# Patient Record
Sex: FEMALE | Race: WHITE | NOT HISPANIC OR LATINO | Employment: OTHER | URBAN - METROPOLITAN AREA
[De-identification: names, ages, dates, MRNs, and addresses within clinical notes are randomized per-mention and may not be internally consistent; named-entity substitution may affect disease eponyms.]

---

## 2019-07-08 ENCOUNTER — APPOINTMENT (OUTPATIENT)
Dept: PREADMISSION TESTING | Facility: HOSPITAL | Age: 74
End: 2019-07-08

## 2019-07-08 LAB
ANION GAP SERPL CALCULATED.3IONS-SCNC: 12 MMOL/L (ref 5–15)
BUN BLD-MCNC: 16 MG/DL (ref 8–23)
BUN/CREAT SERPL: 21.1 (ref 7–25)
CALCIUM SPEC-SCNC: 9.1 MG/DL (ref 8.6–10.5)
CHLORIDE SERPL-SCNC: 102 MMOL/L (ref 98–107)
CO2 SERPL-SCNC: 28 MMOL/L (ref 22–29)
CREAT BLD-MCNC: 0.76 MG/DL (ref 0.57–1)
DEPRECATED RDW RBC AUTO: 50.2 FL (ref 37–54)
ERYTHROCYTE [DISTWIDTH] IN BLOOD BY AUTOMATED COUNT: 14.4 % (ref 12.3–15.4)
GFR SERPL CREATININE-BSD FRML MDRD: 74 ML/MIN/1.73
GLUCOSE BLD-MCNC: 86 MG/DL (ref 65–99)
HCT VFR BLD AUTO: 38.4 % (ref 34–46.6)
HGB BLD-MCNC: 12.1 G/DL (ref 12–15.9)
MCH RBC QN AUTO: 30.6 PG (ref 26.6–33)
MCHC RBC AUTO-ENTMCNC: 31.5 G/DL (ref 31.5–35.7)
MCV RBC AUTO: 97 FL (ref 79–97)
PLATELET # BLD AUTO: 315 10*3/MM3 (ref 140–450)
PMV BLD AUTO: 11.2 FL (ref 6–12)
POTASSIUM BLD-SCNC: 4.5 MMOL/L (ref 3.5–5.2)
RBC # BLD AUTO: 3.96 10*6/MM3 (ref 3.77–5.28)
SODIUM BLD-SCNC: 142 MMOL/L (ref 136–145)
WBC NRBC COR # BLD: 8.79 10*3/MM3 (ref 3.4–10.8)

## 2019-07-08 PROCEDURE — 36415 COLL VENOUS BLD VENIPUNCTURE: CPT

## 2019-07-08 PROCEDURE — 80048 BASIC METABOLIC PNL TOTAL CA: CPT | Performed by: SURGERY

## 2019-07-08 PROCEDURE — 93005 ELECTROCARDIOGRAM TRACING: CPT

## 2019-07-08 PROCEDURE — 85027 COMPLETE CBC AUTOMATED: CPT | Performed by: SURGERY

## 2019-07-08 PROCEDURE — 93010 ELECTROCARDIOGRAM REPORT: CPT | Performed by: INTERNAL MEDICINE

## 2019-07-09 ENCOUNTER — TRANSCRIBE ORDERS (OUTPATIENT)
Dept: ADMINISTRATIVE | Facility: HOSPITAL | Age: 74
End: 2019-07-09

## 2019-07-09 DIAGNOSIS — C20 MALIGNANT NEOPLASM OF RECTUM (HCC): Primary | ICD-10-CM

## 2019-07-10 ENCOUNTER — HOSPITAL ENCOUNTER (OUTPATIENT)
Dept: GENERAL RADIOLOGY | Facility: HOSPITAL | Age: 74
Discharge: HOME | End: 2019-07-10

## 2019-07-10 ENCOUNTER — HOSPITAL ENCOUNTER (OUTPATIENT)
Dept: GENERAL RADIOLOGY | Facility: HOSPITAL | Age: 74
Discharge: HOME | End: 2019-07-10
Admitting: INTERNAL MEDICINE

## 2019-07-10 DIAGNOSIS — C20 MALIGNANT NEOPLASM OF RECTUM (HCC): ICD-10-CM

## 2019-07-10 PROCEDURE — 71045 X-RAY EXAM CHEST 1 VIEW: CPT

## 2019-07-10 PROCEDURE — 77001 FLUOROGUIDE FOR VEIN DEVICE: CPT

## 2020-02-26 RX ORDER — RAMIPRIL 5 MG/1
5 CAPSULE ORAL DAILY
COMMUNITY

## 2020-02-26 RX ORDER — FUROSEMIDE 20 MG/1
20 TABLET ORAL 2 TIMES DAILY
COMMUNITY

## 2020-02-26 RX ORDER — ISOSORBIDE MONONITRATE 30 MG/1
30 TABLET, EXTENDED RELEASE ORAL DAILY
COMMUNITY

## 2020-02-26 RX ORDER — PRAVASTATIN SODIUM 20 MG
20 TABLET ORAL DAILY
COMMUNITY

## 2020-02-26 RX ORDER — OMEPRAZOLE 40 MG/1
40 CAPSULE, DELAYED RELEASE ORAL DAILY
COMMUNITY

## 2020-02-26 RX ORDER — HYDROCHLOROTHIAZIDE 25 MG/1
25 TABLET ORAL DAILY
COMMUNITY

## 2020-02-26 RX ORDER — FOLIC ACID 1 MG/1
1 TABLET ORAL DAILY
COMMUNITY

## 2020-02-27 ENCOUNTER — OFFICE VISIT (OUTPATIENT)
Dept: NEUROSURGERY | Facility: CLINIC | Age: 75
End: 2020-02-27

## 2020-02-27 VITALS — WEIGHT: 133 LBS | TEMPERATURE: 98.4 F | HEIGHT: 61 IN | BODY MASS INDEX: 25.11 KG/M2 | RESPIRATION RATE: 15 BRPM

## 2020-02-27 DIAGNOSIS — F41.8 ANXIOUS DEPRESSION: ICD-10-CM

## 2020-02-27 DIAGNOSIS — M48.061 FORAMINAL STENOSIS OF LUMBAR REGION: ICD-10-CM

## 2020-02-27 DIAGNOSIS — M43.16 SPONDYLOLISTHESIS OF LUMBAR REGION: ICD-10-CM

## 2020-02-27 DIAGNOSIS — M51.36 DDD (DEGENERATIVE DISC DISEASE), LUMBAR: ICD-10-CM

## 2020-02-27 DIAGNOSIS — M54.9 MECHANICAL BACK PAIN: Primary | ICD-10-CM

## 2020-02-27 PROCEDURE — 99204 OFFICE O/P NEW MOD 45 MIN: CPT | Performed by: NEUROLOGICAL SURGERY

## 2020-02-27 NOTE — PROGRESS NOTES
NAME: MIGUEL VELÁSQUEZ   DOS: 2020  : 1945  PCP: Man Lehman MD    Chief Complaint:    Chief Complaint   Patient presents with   • Back Pain       History of Present Illness:  74 y.o. female   I saw this 74-year-old female with a history of advanced rheumatoid disease she is to be on Humira and well controlled but has had difficulty with fibromyalgia and rheumatologic complaints for a number of years.  She takes care of and infirmed  with multiple back surgeries she has a history of atrial fibrillation is on Eliquis.    She reports today that she will hurts all over she has a history of back pain bilateral radiating pain to the flank she does have overtones of neurogenic claudication but the predominance of her symptoms is knee hip ankle shoulder and wrist pain she denies any bowel bladder incontinence issues she reports no worsening she is here for evaluation    PMHX  Allergies:  Allergies   Allergen Reactions   • Milnacipran Other (See Comments)     Headache   • Ranolazine Other (See Comments)     Headache     • Penicillins Rash     Medications    Current Outpatient Medications:   •  apixaban (ELIQUIS) 5 MG tablet tablet, Take 5 mg by mouth 2 (Two) Times a Day., Disp: , Rfl:   •  dronedarone (MULTAQ) 400 MG tablet, Take 400 mg by mouth 2 (Two) Times a Day With Meals., Disp: , Rfl:   •  folic acid (FOLVITE) 1 MG tablet, Take 1 mg by mouth Daily., Disp: , Rfl:   •  furosemide (LASIX) 20 MG tablet, Take 20 mg by mouth 2 (Two) Times a Day., Disp: , Rfl:   •  hydroCHLOROthiazide (HYDRODIURIL) 25 MG tablet, Take 25 mg by mouth Daily., Disp: , Rfl:   •  isosorbide mononitrate (IMDUR) 30 MG 24 hr tablet, Take 30 mg by mouth Daily., Disp: , Rfl:   •  methotrexate 2.5 MG tablet, Take  by mouth 3 (Three) Doses Each Week. Take Doses 12 (Twelve) Hours Apart., Disp: , Rfl:   •  omeprazole (priLOSEC) 40 MG capsule, Take 40 mg by mouth Daily., Disp: , Rfl:   •  pravastatin (PRAVACHOL) 20 MG tablet,  Take 20 mg by mouth Daily., Disp: , Rfl:   •  ramipril (ALTACE) 5 MG capsule, Take 5 mg by mouth Daily., Disp: , Rfl:   •  sertraline (ZOLOFT) 50 MG tablet, Take 50 mg by mouth Daily., Disp: , Rfl:   Past Medical History:  Past Medical History:   Diagnosis Date   • Atrial fibrillation (CMS/HCC)    • B12 deficiency    • Female bladder prolapse    • Fibromyalgia    • Low back pain    • Rheumatoid arthritis (CMS/HCC)      Past Surgical History:  History reviewed. No pertinent surgical history.  Social Hx:  Social History     Tobacco Use   • Smoking status: Never Smoker   • Smokeless tobacco: Never Used   Substance Use Topics   • Alcohol use: Never     Frequency: Never   • Drug use: Never     Family Hx:  Family History   Problem Relation Age of Onset   • Diabetes Mother    • Diabetes Father    • Diabetes Brother    • Diabetes Son      Review of Systems:        Review of Systems   Constitutional: Negative for activity change, appetite change, chills, diaphoresis, fatigue, fever and unexpected weight change.   HENT: Negative for congestion, dental problem, drooling, ear discharge, ear pain, facial swelling, hearing loss, mouth sores, nosebleeds, postnasal drip, rhinorrhea, sinus pressure, sneezing, sore throat, tinnitus, trouble swallowing and voice change.    Eyes: Negative for photophobia, pain, discharge, redness, itching and visual disturbance.   Respiratory: Negative for apnea, cough, choking, chest tightness, shortness of breath, wheezing and stridor.    Cardiovascular: Negative for chest pain, palpitations and leg swelling.   Gastrointestinal: Negative for abdominal distention, abdominal pain, anal bleeding, blood in stool, constipation, diarrhea, nausea, rectal pain and vomiting.   Endocrine: Negative for cold intolerance, heat intolerance, polydipsia, polyphagia and polyuria.   Genitourinary: Negative for decreased urine volume, difficulty urinating, dysuria, enuresis, flank pain, frequency, genital sores,  hematuria and urgency.   Musculoskeletal: Positive for arthralgias and back pain. Negative for gait problem, joint swelling, myalgias, neck pain and neck stiffness.   Skin: Negative for color change, pallor, rash and wound.   Allergic/Immunologic: Negative for environmental allergies, food allergies and immunocompromised state.   Neurological: Negative for dizziness, tremors, seizures, syncope, facial asymmetry, speech difficulty, weakness, light-headedness, numbness and headaches.   Hematological: Negative for adenopathy. Does not bruise/bleed easily.   Psychiatric/Behavioral: Negative for agitation, behavioral problems, confusion, decreased concentration, dysphoric mood, hallucinations, self-injury, sleep disturbance and suicidal ideas. The patient is not nervous/anxious and is not hyperactive.    All other systems reviewed and are negative.           Physical Examination:  Vitals:    02/27/20 1246   Resp: 15   Temp: 98.4 °F (36.9 °C)      General Appearance:   Appears appropriate for age.  Neurological examination:  Neurologic Exam  Vital signs were reviewed and documented in the chart  Patient appeared in good neurologic function with normal comprehension fluent speech  Mood and affect are normal  Sense of smell deferred    Pupils symmetric equally reactive funduscopic exam not visualized   Visual fields intact to confrontation  Extraocular movements intact  Face motor function is symmetric  Facial sensations normal  Hearing intact to finger rub hearing intact to finger rub  Tongue is midline  Palate symmetric  Swallowing normal  Shoulder shrug normal    Muscle bulk and tone slight build  5 out of 5 strength no motor drift she has difficulty toe walking secondary to arthritis in her toes but can push up bilaterally  Gait normal intact wide-based with mildly stooped forward posture  Negative Romberg  No clonus long tract signs or myelopathy    Reflexes symmetric she has trace but actually winces in pain with  elbow and knee reflexes they are mostly absent throughout  1+ edema noted and extremities skin appears normal    Straight leg raise sign positive bilaterally but secondary to guarding  Bilateral signs of intrinsic hip dysfunction  Back is without any lesions or abnormality          Review of Imaging/DATA:  I reviewed her MRI is relatively impressive she has bilateral high-grade foraminal stenosis at 3 4 she is got a spondylolisthesis 4 5 and may be spondylolysis 5 1  Diagnoses/Plan:    Ms. Myles is a 74 y.o. female   1.  Quality of life issues secondary to depression, coping skills, chronic rheumatoid disease, fibromyalgi plan for she needs a 360 pain evaluation with somebody who also does opioid management    2.a needs aquatic therapy physical therapy range of motion exercises    3.  Needs pain psychologist to help with coping skills at home I explained and spent 45 minutes with her if they can get her to a point where her symptoms are controlled and she really does not only have quality of life issues as it relates to bilateral neurogenic claudication she may be a candidate for lumbar interbody fusion multilevel I explained the risk would be high given her age and atrial fibrillation history as well as likely osteopenia but she does have significant spinal pathology intraforaminal E but nothing that should progress to a paralytic issue    Is been a pleasure via neurosurgical care will be happy to see her back anytime in the future

## 2020-02-28 DIAGNOSIS — M48.062 SPINAL STENOSIS, LUMBAR REGION, WITH NEUROGENIC CLAUDICATION: Primary | ICD-10-CM

## 2024-05-10 ENCOUNTER — TELEPHONE (OUTPATIENT)
Age: 79
End: 2024-05-10
Payer: MEDICARE

## 2024-05-10 DIAGNOSIS — M05.741 RHEUMATOID ARTHRITIS INVOLVING BOTH HANDS WITH POSITIVE RHEUMATOID FACTOR: Primary | ICD-10-CM

## 2024-05-10 DIAGNOSIS — M05.742 RHEUMATOID ARTHRITIS INVOLVING BOTH HANDS WITH POSITIVE RHEUMATOID FACTOR: Primary | ICD-10-CM

## 2024-05-10 DIAGNOSIS — R53.82 CHRONIC FATIGUE: ICD-10-CM

## 2024-05-10 DIAGNOSIS — Z79.899 MEDICATION MANAGEMENT: ICD-10-CM

## 2024-05-10 RX ORDER — METHYLPREDNISOLONE 4 MG/1
TABLET ORAL
Qty: 1 EACH | Refills: 0 | Status: SHIPPED | OUTPATIENT
Start: 2024-05-10

## 2024-05-10 NOTE — TELEPHONE ENCOUNTER
I called and spoke to Yadi, pt.'s daughter. She stated that she has an apt. This upcoming week to have the port removed.  Pt. Is also in a flare X 1 week. The MCP joints in both hands are red, warm, swollen and painful.  She wants to know if there is anything the pt. Can take until her f/u on 5/22/24 ?

## 2024-05-10 NOTE — TELEPHONE ENCOUNTER
is she still on subcu Actemra?  If so would hold it the week before and the week after her procedure.  We could send her a Medrol Dosepak. Needs cbc/cmp

## 2024-05-10 NOTE — TELEPHONE ENCOUNTER
Spoke with pt. Daughter Yadi and let her know we are sending her in a medrol laurel.  She Is still taking Actemra. Advised her to hold  a week before and after procedure.  I'm mailing her a lab order

## 2024-05-24 ENCOUNTER — TELEPHONE (OUTPATIENT)
Age: 79
End: 2024-05-24
Payer: MEDICARE

## 2024-05-24 DIAGNOSIS — M81.0 OSTEOPOROSIS, UNSPECIFIED OSTEOPOROSIS TYPE, UNSPECIFIED PATHOLOGICAL FRACTURE PRESENCE: Primary | ICD-10-CM

## 2024-05-24 NOTE — TELEPHONE ENCOUNTER
PTS DAUGHTER CHRISTINA PINKSTON CALLED REGARDING A DEXA SCAN FOR HER MOM.  SHE WAS SUPPOSED TO HAVE ONE THIS MONTH.  SINCE WE WON'T BE DOING THEM UNTIL EARLY 2025 SHE WONDERED IF SHE COULD GET AN ORDER SENT TO HER SO THEY CAN GET IT DONE ELSEWHERE.  CHRISTINA'S ADDRESS IS   [address] [place], [place] [id number].

## 2024-06-02 NOTE — TELEPHONE ENCOUNTER
1.  How is she doing after her port was removed?  #2 we can schedule her bone density scan for [facility] or [facility] if that is agreeable to her  3 patient needs ambulatory referral for a bone density scan diagnosis is osteoporosis scan was in 2021

## 2024-07-03 ENCOUNTER — TELEPHONE (OUTPATIENT)
Age: 79
End: 2024-07-03

## 2024-07-03 DIAGNOSIS — M81.0 OSTEOPOROSIS, UNSPECIFIED OSTEOPOROSIS TYPE, UNSPECIFIED PATHOLOGICAL FRACTURE PRESENCE: Primary | ICD-10-CM

## 2024-07-03 NOTE — TELEPHONE ENCOUNTER
Spoke to pt's daughter Yadi.  Pt is doing well from Port removal.  She said the pt's Alzhimer's has gotten worse.  I let her know that [facility] is going to look through her schedule and work her in.  Pt. Top of hand sore and swollen.  She is taking Actemra.   She wants the Dexa in [place] or [place].

## 2024-07-24 ENCOUNTER — TRANSCRIBE ORDERS (OUTPATIENT)
Dept: ADMINISTRATIVE | Facility: HOSPITAL | Age: 79
End: 2024-07-24
Payer: MEDICARE

## 2024-07-24 DIAGNOSIS — Z78.0 POST-MENOPAUSAL: Primary | ICD-10-CM

## 2024-08-05 ENCOUNTER — TELEPHONE (OUTPATIENT)
Age: 79
End: 2024-08-05
Payer: MEDICARE

## 2024-08-05 NOTE — TELEPHONE ENCOUNTER
CALLED PT TO RESCHEDULE APPT WITH DR. VILLALPANDO, NO ANSWER. LEFT VM FOR PT TO CALL BACK. IF PT CALLS BACK, SCHEDULE PT WITH RONNIE OR EVVA IF NEEDED. IF PT WANTS TO SEE DR. VILLALPANDO THEY MUST BE SCHEDULED AFTER 12/01

## 2024-08-05 NOTE — TELEPHONE ENCOUNTER
Pt daughter called to request dexa scan order be sent to [facility] in [place], Faxed order to [phone]

## 2024-08-07 ENCOUNTER — TELEPHONE (OUTPATIENT)
Age: 79
End: 2024-08-07
Payer: MEDICARE

## 2024-08-07 RX ORDER — TOCILIZUMAB 180 MG/ML
162 INJECTION, SOLUTION SUBCUTANEOUS
COMMUNITY
End: 2024-08-19

## 2024-08-07 NOTE — TELEPHONE ENCOUNTER
Caller: CHRISTINA    Relationship to patient: Child    Best call back number: [phone]; [facility] TO [facility]    Patient is needing: PATIENT'S DAUGHTER CALLED; PATIENT  IS OUT OF REFILLS FOR THE RX ACTEMRA THAT SHE TAKES EVERY TWO WEEKS. IT IS MAILED TO HER FROM [facility].    MAIN #: [phone]; [address] [address]    PHARMACY # [phone]

## 2024-08-07 NOTE — TELEPHONE ENCOUNTER
PTS DAUGHTER IS REQUESTING REFILLS ON ACTEMRA. THE PT WAS SCHEDULED FOR A FOLLOW UP IN MAY BUT HAD TO BE CANCELED DUE TO PROVIDER BEING OUT. I CALLED THE PTS DAUGHTER TO RESCHEDULE BUT HAD TO LEAVE A MESSAGE.   T IS MAILED TO HER FROM [facility].     MAIN #: [phone]; [address] [facility], [place] [address]     PHARMACY # [phone]

## 2024-08-12 NOTE — TELEPHONE ENCOUNTER
Patients daughter called again regarding Actemra refills. I advised her we need updated labs on patient (last form 12/23) before we can send in the refills. Verbalized understanding stating they would go today to have those drawn. Faxed lab order to [facility] lab.

## 2024-08-19 NOTE — TELEPHONE ENCOUNTER
Patient now has follow up appointment and we received lab results from 8/13/24. Sent in Actemra to [facility].

## 2024-08-29 ENCOUNTER — TELEPHONE (OUTPATIENT)
Age: 79
End: 2024-08-29
Payer: MEDICARE

## 2024-09-13 PROBLEM — M79.7 FIBROMYALGIA: Status: ACTIVE | Noted: 2024-09-13

## 2024-09-13 PROBLEM — Z79.899 ENCOUNTER FOR LONG-TERM (CURRENT) USE OF HIGH-RISK MEDICATION: Status: ACTIVE | Noted: 2024-09-13

## 2024-09-13 PROBLEM — N28.9 RENAL INSUFFICIENCY: Status: ACTIVE | Noted: 2024-09-13

## 2024-09-13 PROBLEM — Z51.81 ENCOUNTER FOR MEDICATION MONITORING: Status: ACTIVE | Noted: 2024-09-13

## 2024-09-13 PROBLEM — M81.0 OSTEOPOROSIS: Status: ACTIVE | Noted: 2024-09-13

## 2024-09-17 ENCOUNTER — TELEPHONE (OUTPATIENT)
Age: 79
End: 2024-09-17

## 2024-09-17 ENCOUNTER — OFFICE VISIT (OUTPATIENT)
Age: 79
End: 2024-09-17
Payer: MEDICARE

## 2024-09-17 VITALS
HEIGHT: 61 IN | BODY MASS INDEX: 26.45 KG/M2 | WEIGHT: 140.1 LBS | DIASTOLIC BLOOD PRESSURE: 78 MMHG | SYSTOLIC BLOOD PRESSURE: 140 MMHG | HEART RATE: 60 BPM | TEMPERATURE: 97.1 F

## 2024-09-17 DIAGNOSIS — R06.2 WHEEZES: ICD-10-CM

## 2024-09-17 DIAGNOSIS — N28.9 RENAL INSUFFICIENCY: ICD-10-CM

## 2024-09-17 DIAGNOSIS — R53.83 FATIGUE, UNSPECIFIED TYPE: ICD-10-CM

## 2024-09-17 DIAGNOSIS — Z51.81 ENCOUNTER FOR MEDICATION MONITORING: ICD-10-CM

## 2024-09-17 DIAGNOSIS — M79.7 FIBROMYALGIA: ICD-10-CM

## 2024-09-17 DIAGNOSIS — M81.0 OSTEOPOROSIS, UNSPECIFIED OSTEOPOROSIS TYPE, UNSPECIFIED PATHOLOGICAL FRACTURE PRESENCE: ICD-10-CM

## 2024-09-17 DIAGNOSIS — Z79.899 ENCOUNTER FOR LONG-TERM (CURRENT) USE OF HIGH-RISK MEDICATION: ICD-10-CM

## 2024-09-17 DIAGNOSIS — M06.9 RHEUMATOID ARTHRITIS, INVOLVING UNSPECIFIED SITE, UNSPECIFIED WHETHER RHEUMATOID FACTOR PRESENT: Primary | ICD-10-CM

## 2024-09-17 PROCEDURE — G2211 COMPLEX E/M VISIT ADD ON: HCPCS | Performed by: NURSE PRACTITIONER

## 2024-09-17 PROCEDURE — 1160F RVW MEDS BY RX/DR IN RCRD: CPT | Performed by: NURSE PRACTITIONER

## 2024-09-17 PROCEDURE — 1159F MED LIST DOCD IN RCRD: CPT | Performed by: NURSE PRACTITIONER

## 2024-09-17 PROCEDURE — 99215 OFFICE O/P EST HI 40 MIN: CPT | Performed by: NURSE PRACTITIONER

## 2024-09-17 RX ORDER — FAMOTIDINE 20 MG/1
20 TABLET, FILM COATED ORAL 2 TIMES DAILY
Qty: 60 TABLET | Refills: 3 | Status: SHIPPED | OUTPATIENT
Start: 2024-09-17

## 2024-09-17 RX ORDER — GABAPENTIN 100 MG/1
100 CAPSULE ORAL 2 TIMES DAILY
Qty: 60 CAPSULE | Refills: 3 | Status: SHIPPED | OUTPATIENT
Start: 2024-09-17

## 2024-09-17 RX ORDER — METHOTREXATE 2.5 MG/1
15 TABLET ORAL WEEKLY
Qty: 24 TABLET | Refills: 3 | Status: SHIPPED | OUTPATIENT
Start: 2024-09-17

## 2024-09-17 RX ORDER — FOLIC ACID 1 MG/1
1 TABLET ORAL DAILY
Qty: 90 TABLET | Refills: 1 | Status: SHIPPED | OUTPATIENT
Start: 2024-09-17

## 2024-09-27 ENCOUNTER — TELEPHONE (OUTPATIENT)
Age: 79
End: 2024-09-27
Payer: MEDICARE

## 2024-10-04 ENCOUNTER — SPECIALTY PHARMACY (OUTPATIENT)
Age: 79
End: 2024-10-04
Payer: MEDICARE

## 2024-10-04 ENCOUNTER — SPECIALTY PHARMACY (OUTPATIENT)
Dept: GENERAL RADIOLOGY | Facility: HOSPITAL | Age: 79
End: 2024-10-04
Payer: MEDICARE

## 2024-10-04 RX ORDER — DENOSUMAB 60 MG/ML
60 INJECTION SUBCUTANEOUS
Qty: 180 ML | Refills: 0 | Status: SHIPPED | OUTPATIENT
Start: 2024-10-04

## 2024-10-04 NOTE — PROGRESS NOTES
Specialty Pharmacy Patient Management Program  Rheumatology Initial Assessment     Marcia Myles was referred by an Rheumatology provider to the Rheumatology Patient Management program offered by [facility] for Osteoporosis on 10/04/24.  An initial outreach was conducted, including assessment of therapy appropriateness and specialty medication education for Prolia. The patient was introduced to services offered by [facility], including: regular assessments, refill coordination, curbside pick-up or mail order delivery options, prior authorization maintenance, and financial assistance programs as applicable. The patient was also provided with contact information for the pharmacy team.     Insurance Coverage & Financial Support  [employer] D     Relevant Past Medical History and Comorbidities  Relevant medical history and concomitant health conditions were discussed with the patient. The patient's chart has been reviewed for relevant past medical history and comorbid conditions and updated as necessary.  Past Medical History:   Diagnosis Date    Alzheimer disease     Followed by  neuroscience    Atrial fibrillation     RONNIE 02/05/2018 -Followed by Dr. Marie. Treated with Meds.    B12 deficiency     Bladder prolapse, female, acquired     DDD (degenerative disc disease), lumbar     severe- Dr. Parry/ pain clinic    Encounter for immunological test     Fatigue     Female bladder prolapse     Fibromyalgia     Irritability     Low back pain     Osteoporosis     Rash     Rheumatoid arthritis      Social History     Socioeconomic History    Marital status:     Number of children: 2   Tobacco Use    Smoking status: Never    Smokeless tobacco: Never   Vaping Use    Vaping status: Never Used   Substance and Sexual Activity    Alcohol use: Never    Drug use: Never    Sexual activity: Defer       Problem list reviewed by Jami Simpson, PharmD on 10/4/2024 at  9:15  "AM    Allergies  Known allergies and reactions were discussed with the patient. The patient's chart has been reviewed for  allergy information and updated as necessary.   Allergies   Allergen Reactions    Milnacipran Other (See Comments)     Headache    Ranolazine Other (See Comments)     Headache      Penicillins Rash       Allergies reviewed by Jami Simpson, PharmOCTAVIA on 10/4/2024 at  9:15 AM  Allergies reviewed by Jami Simpson PharmD on 10/4/2024 at  9:21 AM    Relevant Laboratory Values  Relevant laboratory values were discussed with the patient. The following specialty medication dose adjustment(s) are recommended:     No results found for: \"HGBA1C\"  Lab Results   Component Value Date    GLUCOSE 86 07/08/2019    CALCIUM 9.1 07/08/2019     07/08/2019    K 4.5 07/08/2019    CO2 28.0 07/08/2019     07/08/2019    BUN 16 07/08/2019    CREATININE 0.76 07/08/2019    EGFRIFNONA 74 07/08/2019    BCR 21.1 07/08/2019    ANIONGAP 12.0 07/08/2019     No results found for: \"CHOL\", \"CHLPL\", \"TRIG\", \"HDL\", \"LDL\", \"LDLDIRECT\"      Current Medication List  This medication list has been reviewed with the patient and evaluated for any interactions or necessary modifications/recommendations, and updated to include all prescription medications, OTC medications, and supplements the patient is currently taking.  This list reflects what is contained in the patient's profile, which has also been marked as reviewed to communicate to other providers it is the most up to date version of the patient's current medication therapy.     Current Outpatient Medications:     denosumab (Prolia) 60 MG/ML solution prefilled syringe syringe, Inject 1 mL under the skin into the appropriate area as directed Every 6 (Six) Months. in the upper arm, upper thigh or abdomen, Disp: 180 mL, Rfl: 0    acetaminophen (TYLENOL) 650 MG 8 hr tablet, Take 1 tablet by mouth Every 8 (Eight) Hours As Needed (swallowing whole with water. Do not break, " crush, dissolve and/or chew.)., Disp: , Rfl:     apixaban (ELIQUIS) 5 MG tablet tablet, Take 1 tablet by mouth 2 (Two) Times a Day., Disp: , Rfl:     Cholecalciferol 25 MCG (1000 UT) tablet, Take 1 tablet by mouth Daily., Disp: , Rfl:     dronedarone (MULTAQ) 400 MG tablet, Take 1 tablet by mouth 2 (Two) Times a Day With Meals., Disp: , Rfl:     famotidine (PEPCID) 20 MG tablet, Take 1 tablet by mouth 2 (Two) Times a Day., Disp: 60 tablet, Rfl: 3    folic acid (FOLVITE) 1 MG tablet, Take 1 tablet by mouth Daily., Disp: 90 tablet, Rfl: 1    furosemide (LASIX) 20 MG tablet, Take 1 tablet by mouth 2 (Two) Times a Day., Disp: , Rfl:     gabapentin (NEURONTIN) 100 MG capsule, Take 1 capsule by mouth 2 (Two) Times a Day., Disp: 60 capsule, Rfl: 3    hydroCHLOROthiazide (HYDRODIURIL) 25 MG tablet, Take 1 tablet by mouth Daily., Disp: , Rfl:     isosorbide mononitrate (IMDUR) 30 MG 24 hr tablet, Take 1 tablet by mouth Daily., Disp: , Rfl:     magnesium 30 MG tablet, Take 1 tablet by mouth Daily., Disp: , Rfl:     methotrexate 2.5 MG tablet, Take 6 tablets by mouth 1 (One) Time Per Week., Disp: 24 tablet, Rfl: 3    methylPREDNISolone (MEDROL) 4 MG dose pack, Indications: Rheumatoid Arthritis, Disp: 1 each, Rfl: 0    omeprazole (priLOSEC) 40 MG capsule, Take 1 capsule by mouth Daily., Disp: , Rfl:     pravastatin (PRAVACHOL) 20 MG tablet, Take 1 tablet by mouth Daily., Disp: , Rfl:     ramipril (ALTACE) 5 MG capsule, Take 1 capsule by mouth Daily., Disp: , Rfl:     sertraline (ZOLOFT) 50 MG tablet, Take 1 tablet by mouth Daily., Disp: 90 tablet, Rfl: 1    Tocilizumab (ACTPEN) 162 MG/0.9ML solution auto-injector injection, Inject 0.9 mL under the skin into the appropriate area as directed Every 14 (Fourteen) Days., Disp: 1.8 mL, Rfl: 2    vitamin B-12 (CYANOCOBALAMIN) 500 MCG tablet, Take 1 tablet by mouth Daily., Disp: , Rfl:     Medicines reviewed by Jami Simpson, PharmD on 10/4/2024 at  9:15 AM    Drug Interactions  No  medication interactions    Initial Education Provided for Specialty Medication  The patient has been provided with the following education and any applicable administration techniques (i.e. self-injection) have been demonstrated for the therapies indicated. All questions and concerns have been addressed prior to the patient receiving the medication, and the patient has verbalized comprehension of the education and any materials provided. Additional patient education shall be provided and documented upon request by the patient, provider, or payer.    PROLIA® (denosumab)  Medication Expectations   Why am I taking this medication? You are taking this medication to help prevent bone fractures because you have osteoporosis.    What should I expect while on this medication? You should expect to see your bone mineral density increase over a period of 18 to 24 months. During this time, you should expect to get regular lab work as directed by your doctor to monitor your progress.    How does the medication work? Osteoporosis can reduce your bone density, increasing your risk for fractures. Prolia is a monoclonal antibody with affinity for nuclear factor-kappa ligand (RANKL).  Osteoblasts secrete RANKL and RANKL activates osteoclast precursors and subsequent osteolysis which promotes release of bone-derived growth factors and increase calcium levels.  Prolia bind to RANKL and blocks interaction of RANKL and RANK receptors located on osteoclasts that lead to osteoclast formation.     How long will I be on this medication for? The amount of time you will be on this medication will be determined by your doctor based on your lab results.     How do I take this medication? Take as directed on your prescription label. Prolia is generally administered by a health care professional.    What are some possible side effects? The most common side effects include increased calcium in the body (hypercalcemia).  You may also experience  back, muscle, or joint pain, headache, signs of a common cold, or pain in arms or legs.  Your doctor will monitor your calcium levels through regular lab work. Talk with your doctor if you notice these or other side effects that do not go away or get better with time.      What happens if I miss a dose? Will be administered by a health care professional.      Medication Safety   What are things I should warn my doctor immediately about? Tell your doctor if you experience confusion, mouth sores, swelling in the arms or legs, feeling very tried or weak, any new strange groin, hip, or thigh pain, very bad bone, joint, or muscle pain, shortness of breath,  jaw swelling or pain, infection like very bad sore throat, pancreatitis, skin infection, high or low blood pressure, chest pain, abnormal heartbeat, harjit bumps or patches on skin, bladder pain, or passing more urine than usual. Talk to your doctor if you are pregnant, planning to become pregnant, or breastfeeding. Also tell your doctor if you notice any signs/symptoms of an allergic reaction (rash, hives, difficulty breathing, etc.) or blisters on your skin.     What are things that I should be aware of? Be cautious of any side effects from this medication. Talk to your doctor if any new ones develop or aren't getting better.     What are some medications that can interact with this one? There are no currently known medication interactions that would be serious.  However, this does not mean that medication interactions cannot happen.  Always tell your doctor or pharmacist immediately if you start taking any new medications, including over-the-counter medications, vitamins, and herbal supplements.       Medication Storage/Handling   How should I handle this medication? Keep this medication out of reach of pets/children in the original container. Do not remove medication from the injector pen.  Use caution when administering medication as needles are required with  use.     How does this medication need to be stored? Store in the refrigerator. It is important to avoid freezing the medication.  Remove from the refrigerator only when you are ready to inject your dose.    How should I dispose of this medication? Discard syringe into sharps container once used.       Resources/Support   How can I remind myself to take this medication? Since this medication must be administered in a healthcare setting, refills will be prompted by provider and clinic.    Is financial support available?  [employer] can provide co-pay cards if you have commercial insurance or patient assistance if you have Medicare or no insurance.    Which vaccines are recommended for me? Talk to your doctor about these vaccines: Flu, Coronavirus (COVID-19), Pneumococcal (pneumonia), Tdap, Hepatitis B, Zoster (shingles)        Adherence and Self-Administration  Adherence related to the patient's specialty therapy was discussed with the patient. The Adherence segment of this outreach has been reviewed and updated.     Is there a concern with patient's ability to self administer the medication correctly and without issue?: No  Were any potential barriers to adherence identified during the initial assessment or patient education?: No  Are there any concerns regarding the patient's understanding of the importance of medication adherence?: No  Methods for Supporting Patient Adherence and/or Self-Administration: n/a-receiving injection in the offoce    Open Medication Therapy Problems  No medication therapy recommendations to display    Goals of Therapy  Goals related to the patient's specialty therapy were discussed with the patient. The Patient Goals segment of this outreach has been reviewed and updated.   Goals Addressed Today        Specialty Pharmacy General Goal      To decrease fractures and improve/stabilize DEXA scan.            Reassessment Plan & Follow-Up  1. Medication Therapy Changes: Prolia 60mg injection every  6 months  2. Related Plans, Therapy Recommendations, or Therapy Problems to Be Addressed: no questions at this time.  3. Pharmacist to perform regular assessments no more than (6) months from the previous assessment.  4. Care Coordinator to set up future refill outreaches, coordinate prescription delivery, and escalate clinical questions to pharmacist.  5. Welcome information and patient satisfaction survey to be sent by specialty pharmacy team with patient's initial fill.    Attestation  Therapeutic appropriateness: Appropriate   I attest the patient was actively involved in and has agreed to the above plan of care. If the prescribed therapy is at any point deemed not appropriate based on the current or future assessments, a consultation will be initiated with the patient's specialty care provider to determine the best course of action. The revised plan of therapy will be documented along with any required assessments and/or additional patient education provided.     Jami Simpson, PharmD, BCPS  Clinical Specialty Pharmacist, Rheumatology   10/4/2024  09:23 EDT

## 2024-10-11 ENCOUNTER — CLINICAL SUPPORT (OUTPATIENT)
Age: 79
End: 2024-10-11
Payer: MEDICARE

## 2024-10-11 VITALS
BODY MASS INDEX: 26.49 KG/M2 | DIASTOLIC BLOOD PRESSURE: 92 MMHG | SYSTOLIC BLOOD PRESSURE: 231 MMHG | TEMPERATURE: 98.1 F | HEART RATE: 54 BPM | WEIGHT: 140.2 LBS

## 2024-10-11 DIAGNOSIS — M81.0 OSTEOPOROSIS, UNSPECIFIED OSTEOPOROSIS TYPE, UNSPECIFIED PATHOLOGICAL FRACTURE PRESENCE: Primary | ICD-10-CM

## 2024-10-11 PROCEDURE — 96372 THER/PROPH/DIAG INJ SC/IM: CPT | Performed by: INTERNAL MEDICINE

## 2024-10-11 NOTE — PROGRESS NOTES
Patient is in for Subcutaneous Prolia injection today. Patient's calcium from 08/13/24 was 9.7 and vitamin D from 10/07/24 was 57.2. Patient's last dose of Prolia was 10/2022 per Mauri GARCIA. Lot number is [id number], expiration date is 02/28/27 and NDC is 75137-810-34. This is from [facility]. Patient is receiving 1mL at a concentration of 60mg/mL in her right upper arm.Patient's  denied any recent dental work. Patient's blood pressure was significantly elevated at appointment today. It was 231/92. Patient and  arrived a hour early for injection and patient has not taken her blood pressure medication today. Patient stated she figured her blood pressure would be elevated as she was very agitated. Patient has alzheimer's and was becoming increasing agitated waiting for appointment and dealing with family stress. Patient was not symptomatic. I spoke with Dr. Lo who stated that this was ok to proceed with injection. Patient tolerated injection well. Vital signs stable. Patient did not appear to be in any distress during visit. There were no signs or symptoms of reaction.

## 2024-10-24 ENCOUNTER — TELEPHONE (OUTPATIENT)
Age: 79
End: 2024-10-24

## 2024-10-24 NOTE — TELEPHONE ENCOUNTER
Caller: CHRISTINA RUSSELL    Relationship: Pilar COLLINS     Best call back number: [phone]    Requested Prescriptions: ACTEMRA 162 MG INJECTION ONCE EVERY TWO WEEKS  Requested Prescriptions      No prescriptions requested or ordered in this encounter        Pharmacy where request should be sent:  [facility] [phone]  PHARMACY SERVICES # [phone]     Last office visit with prescribing clinician: Visit date not found   Last telemedicine visit with prescribing clinician: Visit date not found   Next office visit with prescribing clinician: 1/28/2025     Additional details provided by patient: PT HAS ALZHEIMERS.. PHARMACY ADVISE OUT OF PRESCRIPTIONS AND FAX HAS BEEN SENT TO OFFICE AS WELL.    Does the patient have less than a 3 day supply:  [] Yes  [x] No    If the office needs to give you a call back, can they leave a voicemail: [x] Yes [] No    Mehdi Major Rep   10/24/24 15:03 EDT

## 2024-10-25 NOTE — TELEPHONE ENCOUNTER
Specialty Pharmacy Patient Management Program  Per Protocol Prescription Order/Refill     Patient currently fills medications at Other and is not enrolled in an Rheumatology Patient Management Program.     Requested Prescriptions     Signed Prescriptions Disp Refills    Tocilizumab (ACTPEN) 162 MG/0.9ML solution auto-injector injection 5.4 mL 0     Sig: Inject 0.9 mL under the skin into the appropriate area as directed Every 14 (Fourteen) Days.     Authorizing Provider: JOE VILLALPANDO     Ordering User: SURAJ ALEXANDER     Prescription orders above were sent to the pharmacy per Collaborative Care Agreement Protocol.

## 2024-12-17 ENCOUNTER — TELEPHONE (OUTPATIENT)
Age: 79
End: 2024-12-17
Payer: MEDICARE

## 2024-12-17 NOTE — TELEPHONE ENCOUNTER
File Link    Scan on 12/17/2024 1610 by [employer], [place]: MEDVANTX,ACTEMRA 12/17/24        Key Information    Document ID File Type Document Type Description   [id number] Image REFERRAL/PRE-AUTH CSN - SCAN MEDVANTX,ACTEMRA 12/17/24     Import Information    Attached At Date Time User Dept   Encounter Level 12/17/2024  4:10 PM [employer], [facility]      Encounter    Scanned Document on 12/17/24 with [employer], [place]

## 2024-12-18 NOTE — TELEPHONE ENCOUNTER
Specialty Pharmacy Patient Management Program  Per Protocol Prescription Order/Refill     Patient currently fills medications at Other and is not enrolled in an Rheumatology Patient Management Program.     Requested Prescriptions     Signed Prescriptions Disp Refills    Tocilizumab (ACTPEN) 162 MG/0.9ML solution auto-injector injection 5.4 mL 0     Sig: Inject 0.9 mL under the skin into the appropriate area as directed Every 14 (Fourteen) Days.     Authorizing Provider: PONCHO VELÁSQUEZ     Ordering User: SURAJ ALEXANDER     Prescription orders above were sent to the pharmacy per Collaborative Care Agreement Protocol.

## 2024-12-30 RX ORDER — FAMOTIDINE 20 MG/1
20 TABLET, FILM COATED ORAL 2 TIMES DAILY
Qty: 60 TABLET | Refills: 3 | Status: SHIPPED | OUTPATIENT
Start: 2024-12-30

## 2025-01-22 ENCOUNTER — TELEPHONE (OUTPATIENT)
Age: 80
End: 2025-01-22
Payer: MEDICARE

## 2025-01-22 NOTE — TELEPHONE ENCOUNTER
PT APPT HAD TO BE CANCELLED. IF PT CALLS BACK TO RESCHEDULE, PLEASE SCHEDULE WITH RONNIE LOUISE OR DOROTHY. WE ARE CURRENTLY UNABLE TO RESCHEDULE ANY PATIENTS WITH DR. VILLALPANDO UNTIL FURTHER NOTICE. PTS WILL NEED TO BE RESCHEDULED WITH RONNIE OR DOROTHY. PT HAS BEEN ADDED TO THE CANCELLATION LIST AS HIGH PRIORITY.   -HUB READY TO SCHEDULE.

## 2025-01-27 DIAGNOSIS — M06.9 RHEUMATOID ARTHRITIS, INVOLVING UNSPECIFIED SITE, UNSPECIFIED WHETHER RHEUMATOID FACTOR PRESENT: ICD-10-CM

## 2025-01-27 DIAGNOSIS — Z79.899 ENCOUNTER FOR LONG-TERM (CURRENT) USE OF HIGH-RISK MEDICATION: ICD-10-CM

## 2025-01-28 ENCOUNTER — OFFICE VISIT (OUTPATIENT)
Age: 80
End: 2025-01-28
Payer: MEDICARE

## 2025-01-28 DIAGNOSIS — M79.7 FIBROMYALGIA: ICD-10-CM

## 2025-01-28 DIAGNOSIS — Z51.81 ENCOUNTER FOR MEDICATION MONITORING: ICD-10-CM

## 2025-01-28 DIAGNOSIS — M81.0 OSTEOPOROSIS, UNSPECIFIED OSTEOPOROSIS TYPE, UNSPECIFIED PATHOLOGICAL FRACTURE PRESENCE: ICD-10-CM

## 2025-01-28 DIAGNOSIS — R53.83 FATIGUE, UNSPECIFIED TYPE: ICD-10-CM

## 2025-01-28 DIAGNOSIS — M06.9 RHEUMATOID ARTHRITIS, INVOLVING UNSPECIFIED SITE, UNSPECIFIED WHETHER RHEUMATOID FACTOR PRESENT: Primary | ICD-10-CM

## 2025-01-28 DIAGNOSIS — N28.9 RENAL INSUFFICIENCY: ICD-10-CM

## 2025-01-28 DIAGNOSIS — Z79.899 ENCOUNTER FOR LONG-TERM (CURRENT) USE OF HIGH-RISK MEDICATION: ICD-10-CM

## 2025-01-28 PROBLEM — I10 HYPERTENSIVE DISORDER: Status: ACTIVE | Noted: 2022-07-22

## 2025-01-28 PROBLEM — M51.369 DEGENERATION OF LUMBAR INTERVERTEBRAL DISC: Status: ACTIVE | Noted: 2022-07-22

## 2025-01-28 RX ORDER — METHOTREXATE 2.5 MG/1
TABLET ORAL
Qty: 20 TABLET | Refills: 3 | OUTPATIENT
Start: 2025-01-28

## 2025-01-28 NOTE — ASSESSMENT & PLAN NOTE
4/22 Cr 1.20/47.  Followed by nephrology associates.  6/2022: Cr 0.87/GFR 69  10/22 Cr 0.50/65  5/23 Cr 1.22/45.  12/23 Cr 1.21/46  8/2024 Cr 1.00/57    No nsaids.  Plenty of fluids.   Can use Biofreeze or CBD lotion.

## 2025-01-28 NOTE — ASSESSMENT & PLAN NOTE
Gabapentin.    Signed and discussed controlled substance agreement 9/17/24  UDS ordered  PDMP reviewed

## 2025-01-28 NOTE — TELEPHONE ENCOUNTER
Pt needs a follow up appointment and up to date labs before Methotrexate can be sent in.    HUB OK TO RELAY.

## 2025-01-28 NOTE — ASSESSMENT & PLAN NOTE
Actemra SQ. Well tolerated.  To restart MTX 9/2024  Hepatitis Negative Nov 20th 2017- update with next labs  Chest X-ray no active disease - 1/19  QFN normal 3/23- update today  S/o Covid vaccine + booster. I rec Bivalent booster for covid.    Cbc,Cmp Q 2 mos- 8/2024: mild renal insufficiency, anemia. Discuss anemia with PCP. New standing lab order given.    Would hold Actemra and MTX for any infection or illness, Seek treatment and when well and illness is resolved you may restart medication. Hold Methotrexate one week after booster.

## 2025-01-28 NOTE — ASSESSMENT & PLAN NOTE
Chronic diffuse muscle pain.  Sx wax and wane.  Sleep issues are intermittent.  Currently on Zoloft  50mg per day.   Continue Gabapentin 100 mg BID.     Four Cornerstones of treatment include  1- Treat Sleep- restoration.  2- Exercise- chair aerobics or chair yoga; Water aerobics.  3- Treat anxiety and depression  4- Treat pain. Non addictive muscle relaxer. Biofreeze, Massage , Heat , ICE.  FMS packet given and discussed for her to work with pcp regarding treatment.

## 2025-01-28 NOTE — ASSESSMENT & PLAN NOTE
Severe; Boniva x 2 yrs, then fosamax x 4 yrs.  DEXA severe hips/spine;  Started prolia August 2016.Last Prolia 10/19/22  MRI Sept 2019 showed chronic posterior wedging of L5.  Vitamin D 39.6 in 1/19, TSH normal, B12 344, Kidney, liver , blood counts normal.  6/09/2021 DEXA-  left Hip- TScore -2.3 total hip improved by 5.9%, Femoral neck -2.5 (osteoporosis) No change.  Spine L1-L3 -1.7 (osteopenia) Improved by 15.6%. Left Forearm Total- -4.0 (severe osteoporosis) 1/3 value -3.6 (osteoporosis) no change.   1/22 Vitamin D 30.7.  DEXA 8/13/24 improved to osteopenia left femoral neck -2.3, L spine -1.0    Continue Prolia. Her  thinks her last Prolia was 10/19/2022. We will work on restarting.  Weight bearing exercise as much as possible.   Continue 2000IU of D3 gel caps per day.  If she has any dental work done (root canal, dental implant, extraction) she needs to hold Prolia 3 months before and 3 months after procedure.

## 2025-01-28 NOTE — ASSESSMENT & PLAN NOTE
"Erosive by hand and foot films..per pt, also dx \"AS, psoriasis\" distant past.  Failed meds: Humira (worked well, lost insurance coverage), Cimzia (rash), plaquenil (eyes)  S/p Xeljanz since 4/21/17- Stopped 2/5/18- Not as effective-  Started Simponi Aria March 2018 - Last 1/21/19.  Started Orencia 7/2019- Last 1/15/2020 - per patient this has not been effective.  Started Inflectra March 2nd 2020- Last infusion 12/7/2020  Currently on Actemra  Methotrexate discontinued August 2022 (she was doing well so it was weaned)- restarted 9/2024    Currently on SQ Actemra once weekly   Her  reports she is doing worse. She has had increased hand and foot pain and swelling since her last OV.  Restart MTX 15 mg weekly with daily folic acid.  She reports she will not be able to get free Actemra after this year- we will look into this.  Labs 8/13/24 stable. New order provided to do in 2 months.  Return to clinic 4 months  "

## 2025-02-06 NOTE — ASSESSMENT & PLAN NOTE
Severe; Boniva x 2 yrs, then fosamax x 4 yrs.  Started prolia August 2016.  MRI Sept 2019 showed chronic posterior wedging of L5.  6/09/2021 DEXA-  left Hip TScore -2.3 total hip improved by 5.9%, Femoral neck -2.5 (osteoporosis) No change. Spine L1-L3 -1.7 (osteopenia) Improved by 15.6%. Left Forearm Total- -4.0 (severe osteoporosis) 1/3 value -3.6 (osteoporosis) no change.   DEXA 8/13/24 improved to osteopenia left femoral neck -2.3, L spine -1.0    Continue Prolia.   Most recent dose was given 10/11/24. Next dose due 4/2025.   New lab order placed to do before Prolia injection.   Weight bearing exercise as much as possible.   Continue 2000IU of D3 gel caps per day.  If she has any dental work done (root canal, dental implant, extraction) she needs to hold Prolia 3 months before and 3 months after procedure.  Repeat DEXA 8/2026

## 2025-02-06 NOTE — ASSESSMENT & PLAN NOTE
Gabapentin.    Signed and discussed controlled substance agreement 9/17/24  UDS ordered 2/7/25  PDMP reviewed

## 2025-02-06 NOTE — ASSESSMENT & PLAN NOTE
Actemra SQ. Well tolerated.  MTX restart 9/2024  Hepatitis Negative Nov 20th 2017- update today  QTB negative 10/2/24    Cbc,Cmp Q 2 mos- 10/2024: mild anemia. Discuss anemia with PCP. New standing lab order given.    Would hold Actemra and MTX for any infection or illness, Seek treatment and when well and illness is resolved you may restart medication. Hold Methotrexate one week after booster.

## 2025-02-06 NOTE — ASSESSMENT & PLAN NOTE
Attempted to contact patient no answer vm full will try back later and send livewell message   Chronic diffuse muscle pain.  Sx wax and wane.  Sleep issues are intermittent.    Currently on Zoloft  50mg per day.   Continue Gabapentin 100 mg BID.     Four Cornerstones of treatment include  1- Treat Sleep- restoration.  2- Exercise- chair aerobics or chair yoga; Water aerobics.  3- Treat anxiety and depression  4- Treat pain. Non addictive muscle relaxer. Biofreeze, Massage , Heat , ICE.  FMS packet given and discussed for her to work with pcp regarding treatment.

## 2025-02-06 NOTE — ASSESSMENT & PLAN NOTE
"Erosive by hand and foot films historically  per pt, also dx \"AS, psoriasis\" distant past.  Failed meds: Humira (worked well, lost insurance coverage), Cimzia (rash), plaquenil (eye issues)  S/p Xeljanz since 4/21/17- 2/5/18- Not as effective  Started Simponi Aria March 2018 - Last 1/21/19  Started Orencia 7/2019- Last 1/15/2020 - per patient this was not effective.  Started Inflectra March 2nd 2020- Last infusion 12/7/2020- not effective  Currently on Actemra SQ  Methotrexate discontinued August 2022 (she was doing well so it was weaned)- restarted 9/2024    Currently on SQ Actemra once weekly   Continue MTX 15 mg weekly with daily folic acid.  Labs 10/2/24 stable. New order provided to do today and every 8 weeks.  Return to clinic 4 months  "

## 2025-02-06 NOTE — PROGRESS NOTES
Office Visit       Date: 02/07/2025   Patient Name: Marcia Myles  MRN: [id number]  YOB: 1945    Referring Physician: Man Lehman MD     Chief Complaint:   Chief Complaint   Patient presents with    Follow-up    Rheumatoid Arthritis       History of Present Illness: Marcia Myles is a 79 y.o. female who is here today for follow-up of RA, OA, and osteoporosis.    She is accompanied today by her . She has Alzheimer's dementia.  AM stiffness 40 minutes.  Continues on Actemra weekly.  is concerned that medication may not be covered for 2025. They still have 3-4 doses at home.   She continues on MTX. Tolerates well.   No recent illnesses or infections.  Recent DEXA scan 8/13/2024 did show improvement to osteopenia in the left femoral neck with T-score -2.3.  Her most recent dose of Prolia was 10/11/2024.   She continues on Zoloft and gabapentin for fibromyalgia.    Subjective   Review of systems:  Positive for fatigue, hearing loss, runny nose, photophobia, chest tightness, chest pain, palpitations, joint pain and swelling, back pain, neck stiffness, dry skin, confusion, dizziness, lightheadedness, memory issues, weakness, cold intolerance, decreased concentration, stress, anxiety.  Otherwise negative ROS.    Past Medical History:   Past Medical History:   Diagnosis Date    Alzheimer disease     Followed by  neuroscience    Atrial fibrillation     RONNIE 02/05/2018 -Followed by Dr. Marie. Treated with Meds.    B12 deficiency     Bladder prolapse, female, acquired     DDD (degenerative disc disease), lumbar     severe- Dr. Parry/ pain clinic    Encounter for immunological test     Fatigue     Female bladder prolapse     Fibromyalgia     Irritability     Low back pain     Osteoporosis     Rash     Rheumatoid arthritis        Past Surgical History: History reviewed. No pertinent surgical history.    Family History:   Family History   Problem Relation Age of  Onset    Diabetes Mother     Hypertension Mother     Osteoarthritis Mother     Diabetes Father     Diabetes Brother     Diabetes Son     Gout Son     Osteoarthritis Son     Hypertension Son        Social History:   Social History     Socioeconomic History    Marital status:     Number of children: 2   Tobacco Use    Smoking status: Never    Smokeless tobacco: Never   Vaping Use    Vaping status: Never Used   Substance and Sexual Activity    Alcohol use: Never    Drug use: Never    Sexual activity: Defer       Medications:   Current Outpatient Medications:     acetaminophen (TYLENOL) 650 MG 8 hr tablet, Take 1 tablet by mouth Every 8 (Eight) Hours As Needed (swallowing whole with water. Do not break, crush, dissolve and/or chew.)., Disp: , Rfl:     apixaban (ELIQUIS) 5 MG tablet tablet, Take 1 tablet by mouth 2 (Two) Times a Day., Disp: , Rfl:     Cholecalciferol 25 MCG (1000 UT) tablet, Take 1 tablet by mouth Daily., Disp: , Rfl:     denosumab (Prolia) 60 MG/ML solution prefilled syringe syringe, Inject 1 mL under the skin into the appropriate area as directed Every 6 (Six) Months. in the upper arm, upper thigh or abdomen, Disp: 180 mL, Rfl: 0    dronedarone (MULTAQ) 400 MG tablet, Take 1 tablet by mouth 2 (Two) Times a Day With Meals., Disp: , Rfl:     famotidine (PEPCID) 20 MG tablet, Take 1 tablet by mouth 2 (Two) Times a Day., Disp: 180 tablet, Rfl: 1    folic acid (FOLVITE) 1 MG tablet, Take 1 tablet by mouth Daily., Disp: 90 tablet, Rfl: 1    furosemide (LASIX) 20 MG tablet, Take 1 tablet by mouth 2 (Two) Times a Day., Disp: , Rfl:     gabapentin (NEURONTIN) 100 MG capsule, Take 1 capsule by mouth 2 (Two) Times a Day., Disp: 60 capsule, Rfl: 3    hydroCHLOROthiazide (HYDRODIURIL) 25 MG tablet, Take 1 tablet by mouth Daily., Disp: , Rfl:     isosorbide mononitrate (IMDUR) 30 MG 24 hr tablet, Take 1 tablet by mouth Daily., Disp: , Rfl:     magnesium 30 MG tablet, Take 1 tablet by mouth Daily., Disp: ,  "Rfl:     methotrexate 2.5 MG tablet, Take 6 tablets by mouth 1 (One) Time Per Week., Disp: 24 tablet, Rfl: 3    methylPREDNISolone (MEDROL) 4 MG dose pack, Indications: Rheumatoid Arthritis, Disp: 1 each, Rfl: 0    nitroglycerin (NITROSTAT) 0.4 MG SL tablet, Place 1 tablet under the tongue Every 5 (Five) Minutes As Needed., Disp: , Rfl:     omeprazole (priLOSEC) 40 MG capsule, Take 1 capsule by mouth Daily., Disp: , Rfl:     potassium chloride (KLOR-CON) 8 MEQ CR tablet, Take 1 tablet by mouth 2 (Two) Times a Day., Disp: , Rfl:     pravastatin (PRAVACHOL) 20 MG tablet, Take 1 tablet by mouth Daily., Disp: , Rfl:     ramipril (ALTACE) 5 MG capsule, Take 1 capsule by mouth Daily., Disp: , Rfl:     sertraline (ZOLOFT) 50 MG tablet, Take 1 tablet by mouth Daily., Disp: 90 tablet, Rfl: 1    Tocilizumab (ACTPEN) 162 MG/0.9ML solution auto-injector injection, Inject 0.9 mL under the skin into the appropriate area as directed Every 14 (Fourteen) Days., Disp: 5.4 mL, Rfl: 0    vitamin B-12 (CYANOCOBALAMIN) 500 MCG tablet, Take 1 tablet by mouth Daily., Disp: , Rfl:     Allergies:   Allergies   Allergen Reactions    Milnacipran Other (See Comments)     Headache    Ranolazine Other (See Comments)     Headache      Penicillins Rash       I reviewed the patient's chief complaint, history of present illness, review of systems, past medical history, surgical history, family history, social history, medications and allergy list.     Objective    Vital Signs:   Vitals:    02/07/25 1444   BP: 136/78   Pulse: 62   Temp: 97.6 °F (36.4 °C)   Weight: 64.4 kg (142 lb)   Height: 154.9 cm (61\")   PainSc:   1     Body mass index is 26.83 kg/m².   Defer to PCP       Physical Exam:  Physical Exam  Constitutional:       Appearance: Normal appearance.   HENT:      Head: Normocephalic and atraumatic.   Eyes:      Conjunctiva/sclera: Conjunctivae normal.      Pupils: Pupils are equal, round, and reactive to light.   Cardiovascular:      Rate and " "Rhythm: Normal rate and regular rhythm.      Heart sounds: Normal heart sounds.   Pulmonary:      Effort: Pulmonary effort is normal.      Breath sounds: Normal breath sounds.   Musculoskeletal:         General: Normal range of motion.      Cervical back: Normal range of motion.   Skin:     General: Skin is warm and dry.   Neurological:      General: No focal deficit present.      Mental Status: She is alert. Mental status is at baseline. She is disoriented.            Assessment / Plan    Assessment/Plan:   Diagnoses and all orders for this visit:    1. Rheumatoid arthritis, involving unspecified site, unspecified whether rheumatoid factor present (Primary)  Assessment & Plan:  Erosive by hand and foot films historically  per pt, also dx \"AS, psoriasis\" distant past.  Failed meds: Humira (worked well, lost insurance coverage), Cimzia (rash), plaquenil (eye issues)  S/p Xeljanz since 4/21/17- 2/5/18- Not as effective  Started Simponi Aria March 2018 - Last 1/21/19  Started Orencia 7/2019- Last 1/15/2020 - per patient this was not effective.  Started Inflectra March 2nd 2020- Last infusion 12/7/2020- not effective  Currently on Actemra SQ  Methotrexate discontinued August 2022 (she was doing well so it was weaned)- restarted 9/2024    Currently on SQ Actemra once weekly   Continue MTX 15 mg weekly with daily folic acid.  Labs 10/2/24 stable. New order provided to do today and every 8 weeks.  Return to clinic 4 months    Orders:  -     methotrexate 2.5 MG tablet; Take 6 tablets by mouth 1 (One) Time Per Week.  Dispense: 24 tablet; Refill: 3  -     Comprehensive Metabolic Panel; Standing  -     C-reactive Protein; Standing  -     Sedimentation Rate; Standing  -     CBC Auto Differential; Standing    2. Encounter for long-term (current) use of high-risk medication  Assessment & Plan:  Actemra SQ. Well tolerated.  MTX restart 9/2024  Hepatitis Negative Nov 20th 2017- update today  QTB negative 10/2/24    Cbc,Cmp Q 2 mos- " 10/2024: mild anemia. Discuss anemia with PCP. New standing lab order given.    Would hold Actemra and MTX for any infection or illness, Seek treatment and when well and illness is resolved you may restart medication. Hold Methotrexate one week after booster.    Orders:  -     methotrexate 2.5 MG tablet; Take 6 tablets by mouth 1 (One) Time Per Week.  Dispense: 24 tablet; Refill: 3  -     Comprehensive Metabolic Panel; Standing  -     C-reactive Protein; Standing  -     Sedimentation Rate; Standing  -     CBC Auto Differential; Standing    3. Encounter for medication monitoring  Assessment & Plan:  Gabapentin.    Signed and discussed controlled substance agreement 9/17/24  UDS ordered 2/7/25  PDMP reviewed    Orders:  -     gabapentin (NEURONTIN) 100 MG capsule; Take 1 capsule by mouth 2 (Two) Times a Day.  Dispense: 60 capsule; Refill: 3  -     Urine Drug Screen - Urine, Clean Catch; Future  -     Hepatitis Panel, Acute; Future    4. Fibromyalgia  Assessment & Plan:  Chronic diffuse muscle pain.  Sx wax and wane.  Sleep issues are intermittent.    Currently on Zoloft  50mg per day.   Continue Gabapentin 100 mg BID.     Four Cornerstones of treatment include  1- Treat Sleep- restoration.  2- Exercise- chair aerobics or chair yoga; Water aerobics.  3- Treat anxiety and depression  4- Treat pain. Non addictive muscle relaxer. Biofreeze, Massage , Heat , ICE.  FMS packet given and discussed for her to work with pcp regarding treatment.    Orders:  -     Comprehensive Metabolic Panel; Standing  -     C-reactive Protein; Standing  -     Sedimentation Rate; Standing  -     CBC Auto Differential; Standing  -     sertraline (ZOLOFT) 50 MG tablet; Take 1 tablet by mouth Daily.  Dispense: 90 tablet; Refill: 1    5. Fatigue, unspecified type  Assessment & Plan:  QTB negative 10/2/24  Update hepatitis panel today      6. Osteoporosis, unspecified osteoporosis type, unspecified pathological fracture presence  Assessment &  Plan:  Severe; Boniva x 2 yrs, then fosamax x 4 yrs.  Started prolia August 2016.  MRI Sept 2019 showed chronic posterior wedging of L5.  6/09/2021 DEXA-  left Hip TScore -2.3 total hip improved by 5.9%, Femoral neck -2.5 (osteoporosis) No change. Spine L1-L3 -1.7 (osteopenia) Improved by 15.6%. Left Forearm Total- -4.0 (severe osteoporosis) 1/3 value -3.6 (osteoporosis) no change.   DEXA 8/13/24 improved to osteopenia left femoral neck -2.3, L spine -1.0    Continue Prolia.   Most recent dose was given 10/11/24. Next dose due 4/2025.   New lab order placed to do before Prolia injection.   Weight bearing exercise as much as possible.   Continue 2000IU of D3 gel caps per day.  If she has any dental work done (root canal, dental implant, extraction) she needs to hold Prolia 3 months before and 3 months after procedure.  Repeat DEXA 8/2026    Orders:  -     Comprehensive Metabolic Panel; Future  -     Vitamin D 25 Hydroxy; Future    7. Renal insufficiency  Assessment & Plan:  4/22 Cr 1.20/47.  Followed by nephrology associates.  6/2022: Cr 0.87/GFR 69  10/22 Cr 0.50/65  5/23 Cr 1.22/45.  12/23 Cr 1.21/46  8/2024 Cr 1.00/57    No nsaids.  Plenty of fluids.   Can use Biofreeze or CBD lotion.     Orders:  -     Comprehensive Metabolic Panel; Standing  -     CBC Auto Differential; Standing    8. Chronic fatigue, unspecified  -     Hepatitis Panel, Acute; Future    Other orders  -     folic acid (FOLVITE) 1 MG tablet; Take 1 tablet by mouth Daily.  Dispense: 90 tablet; Refill: 1  -     famotidine (PEPCID) 20 MG tablet; Take 1 tablet by mouth 2 (Two) Times a Day.  Dispense: 180 tablet; Refill: 1        Follow Up:   Return in about 4 months (around 6/7/2025) for Dr. Myles.        RADHA Oakes  [facility]

## 2025-02-06 NOTE — ASSESSMENT & PLAN NOTE
4/22 Cr 1.20/47.  Followed by nephrology associates.  6/2022: Cr 0.87/GFR 69  10/22 Cr 0.50/65  5/23 Cr 1.22/45.  12/23 Cr 1.21/46  8/2024 Cr 1.00/57    No nsaids.  Plenty of fluids.   Can use Biofreeze or CBD lotion.    LT ARM AVF present with palpable  thrill

## 2025-02-07 ENCOUNTER — TELEPHONE (OUTPATIENT)
Age: 80
End: 2025-02-07

## 2025-02-07 ENCOUNTER — OFFICE VISIT (OUTPATIENT)
Age: 80
End: 2025-02-07
Payer: MEDICARE

## 2025-02-07 ENCOUNTER — LAB (OUTPATIENT)
Facility: HOSPITAL | Age: 80
End: 2025-02-07
Payer: MEDICARE

## 2025-02-07 VITALS
SYSTOLIC BLOOD PRESSURE: 136 MMHG | HEIGHT: 61 IN | BODY MASS INDEX: 26.81 KG/M2 | TEMPERATURE: 97.6 F | WEIGHT: 142 LBS | HEART RATE: 62 BPM | DIASTOLIC BLOOD PRESSURE: 78 MMHG

## 2025-02-07 DIAGNOSIS — M81.0 OSTEOPOROSIS, UNSPECIFIED OSTEOPOROSIS TYPE, UNSPECIFIED PATHOLOGICAL FRACTURE PRESENCE: ICD-10-CM

## 2025-02-07 DIAGNOSIS — R53.83 FATIGUE, UNSPECIFIED TYPE: ICD-10-CM

## 2025-02-07 DIAGNOSIS — M79.7 FIBROMYALGIA: ICD-10-CM

## 2025-02-07 DIAGNOSIS — N28.9 RENAL INSUFFICIENCY: ICD-10-CM

## 2025-02-07 DIAGNOSIS — Z79.899 ENCOUNTER FOR LONG-TERM (CURRENT) USE OF HIGH-RISK MEDICATION: ICD-10-CM

## 2025-02-07 DIAGNOSIS — M06.9 RHEUMATOID ARTHRITIS, INVOLVING UNSPECIFIED SITE, UNSPECIFIED WHETHER RHEUMATOID FACTOR PRESENT: Primary | ICD-10-CM

## 2025-02-07 DIAGNOSIS — R53.82 CHRONIC FATIGUE, UNSPECIFIED: ICD-10-CM

## 2025-02-07 DIAGNOSIS — Z51.81 ENCOUNTER FOR MEDICATION MONITORING: ICD-10-CM

## 2025-02-07 PROCEDURE — 36415 COLL VENOUS BLD VENIPUNCTURE: CPT

## 2025-02-07 PROCEDURE — 80074 ACUTE HEPATITIS PANEL: CPT

## 2025-02-07 PROCEDURE — 86480 TB TEST CELL IMMUN MEASURE: CPT

## 2025-02-07 PROCEDURE — 82306 VITAMIN D 25 HYDROXY: CPT

## 2025-02-07 PROCEDURE — 80053 COMPREHEN METABOLIC PANEL: CPT

## 2025-02-07 RX ORDER — GABAPENTIN 100 MG/1
100 CAPSULE ORAL 2 TIMES DAILY
Qty: 60 CAPSULE | Refills: 3 | Status: SHIPPED | OUTPATIENT
Start: 2025-02-07

## 2025-02-07 RX ORDER — FAMOTIDINE 20 MG/1
20 TABLET, FILM COATED ORAL 2 TIMES DAILY
Qty: 180 TABLET | Refills: 1 | Status: SHIPPED | OUTPATIENT
Start: 2025-02-07

## 2025-02-07 RX ORDER — NITROGLYCERIN 0.4 MG/1
1 TABLET SUBLINGUAL
COMMUNITY

## 2025-02-07 RX ORDER — METHOTREXATE 2.5 MG/1
15 TABLET ORAL WEEKLY
Qty: 24 TABLET | Refills: 3 | Status: SHIPPED | OUTPATIENT
Start: 2025-02-07

## 2025-02-07 RX ORDER — FOLIC ACID 1 MG/1
1 TABLET ORAL DAILY
Qty: 90 TABLET | Refills: 1 | Status: SHIPPED | OUTPATIENT
Start: 2025-02-07

## 2025-02-07 RX ORDER — POTASSIUM CHLORIDE 600 MG/1
1 TABLET, FILM COATED, EXTENDED RELEASE ORAL 2 TIMES DAILY
COMMUNITY

## 2025-02-07 NOTE — TELEPHONE ENCOUNTER
Patient's  reports they have about 3-4 injections left in the fridge, but thinks the Actemra will not be covered for this year. Can you check on this?

## 2025-02-08 LAB
25(OH)D3 SERPL-MCNC: 32.2 NG/ML (ref 30–100)
ALBUMIN SERPL-MCNC: 4.1 G/DL (ref 3.5–5.2)
ALBUMIN/GLOB SERPL: 1.4 G/DL
ALP SERPL-CCNC: 59 U/L (ref 39–117)
ALT SERPL W P-5'-P-CCNC: 18 U/L (ref 1–33)
ANION GAP SERPL CALCULATED.3IONS-SCNC: 9 MMOL/L (ref 5–15)
AST SERPL-CCNC: 27 U/L (ref 1–32)
BILIRUB SERPL-MCNC: 0.3 MG/DL (ref 0–1.2)
BUN SERPL-MCNC: 18 MG/DL (ref 8–23)
BUN/CREAT SERPL: 17.1 (ref 7–25)
CALCIUM SPEC-SCNC: 9.3 MG/DL (ref 8.6–10.5)
CHLORIDE SERPL-SCNC: 107 MMOL/L (ref 98–107)
CO2 SERPL-SCNC: 26 MMOL/L (ref 22–29)
CREAT SERPL-MCNC: 1.05 MG/DL (ref 0.57–1)
EGFRCR SERPLBLD CKD-EPI 2021: 53.8 ML/MIN/1.73
GLOBULIN UR ELPH-MCNC: 2.9 GM/DL
GLUCOSE SERPL-MCNC: 100 MG/DL (ref 65–99)
HAV IGM SERPL QL IA: NORMAL
HBV CORE IGM SERPL QL IA: NORMAL
HBV SURFACE AG SERPL QL IA: NORMAL
HCV AB SER QL: NORMAL
POTASSIUM SERPL-SCNC: 3.8 MMOL/L (ref 3.5–5.2)
PROT SERPL-MCNC: 7 G/DL (ref 6–8.5)
SODIUM SERPL-SCNC: 142 MMOL/L (ref 136–145)

## 2025-02-11 LAB
GAMMA INTERFERON BACKGROUND BLD IA-ACNC: 0.07 IU/ML
M TB IFN-G BLD-IMP: NEGATIVE
M TB IFN-G CD4+ BCKGRND COR BLD-ACNC: 0.07 IU/ML
M TB IFN-G CD4+CD8+ BCKGRND COR BLD-ACNC: 0.06 IU/ML
MITOGEN IGNF BCKGRD COR BLD-ACNC: >10 IU/ML
QUANTIFERON INCUBATION: NORMAL
SERVICE CMNT-IMP: NORMAL

## 2025-02-17 ENCOUNTER — TELEPHONE (OUTPATIENT)
Age: 80
End: 2025-02-17
Payer: MEDICARE

## 2025-02-17 NOTE — TELEPHONE ENCOUNTER
LMOM for daughter advising no the patient does not need to take her injection while actively sick. Advised for her to return my call.

## 2025-02-19 ENCOUNTER — SPECIALTY PHARMACY (OUTPATIENT)
Age: 80
End: 2025-02-19
Payer: MEDICARE

## 2025-03-20 ENCOUNTER — TELEPHONE (OUTPATIENT)
Age: 80
End: 2025-03-20
Payer: MEDICARE

## 2025-03-20 NOTE — TELEPHONE ENCOUNTER
Patient will be due for a prolia injection around 4/11/25, copay through prescription plan is ~$846.19. (this would be the remaining balance of her yearly out of pocket max of $2000) will need to call patient and let her know of the cost and then either get her scheduled or send her to infusion to run through medical benefit.

## 2025-03-21 ENCOUNTER — TELEPHONE (OUTPATIENT)
Age: 80
End: 2025-03-21
Payer: MEDICARE

## 2025-03-21 NOTE — TELEPHONE ENCOUNTER
Faxed patient's referral to [facility] to be ran via medical benefit. I made patient's daughter aware.

## 2025-03-21 NOTE — TELEPHONE ENCOUNTER
Spoke with the patients daughter. They would like to look into getting Prolia through the medical benefit.

## 2025-03-25 ENCOUNTER — SPECIALTY PHARMACY (OUTPATIENT)
Age: 80
End: 2025-03-25
Payer: MEDICARE

## 2025-06-23 DIAGNOSIS — M06.9 RHEUMATOID ARTHRITIS, INVOLVING UNSPECIFIED SITE, UNSPECIFIED WHETHER RHEUMATOID FACTOR PRESENT: ICD-10-CM

## 2025-06-23 DIAGNOSIS — Z79.899 ENCOUNTER FOR LONG-TERM (CURRENT) USE OF HIGH-RISK MEDICATION: ICD-10-CM

## 2025-06-23 RX ORDER — METHOTREXATE 2.5 MG/1
15 TABLET ORAL WEEKLY
Qty: 24 TABLET | Refills: 3 | OUTPATIENT
Start: 2025-06-23

## 2025-06-23 RX ORDER — FAMOTIDINE 20 MG/1
20 TABLET, FILM COATED ORAL 2 TIMES DAILY
Qty: 60 TABLET | Refills: 3 | OUTPATIENT
Start: 2025-06-23

## 2025-06-23 NOTE — TELEPHONE ENCOUNTER
Refill requests denied. Pt has appt on 6/25/25. New prescriptions will be sent at that time. -LOVELY Montero

## 2025-06-30 DIAGNOSIS — M06.9 RHEUMATOID ARTHRITIS, INVOLVING UNSPECIFIED SITE, UNSPECIFIED WHETHER RHEUMATOID FACTOR PRESENT: ICD-10-CM

## 2025-06-30 DIAGNOSIS — Z79.899 ENCOUNTER FOR LONG-TERM (CURRENT) USE OF HIGH-RISK MEDICATION: ICD-10-CM

## 2025-07-15 RX ORDER — FOLIC ACID 1 MG/1
1000 TABLET ORAL DAILY
Qty: 90 TABLET | Refills: 1 | OUTPATIENT
Start: 2025-07-15

## 2025-07-22 ENCOUNTER — TELEPHONE (OUTPATIENT)
Age: 80
End: 2025-07-22

## 2025-07-22 ENCOUNTER — OFFICE VISIT (OUTPATIENT)
Age: 80
End: 2025-07-22
Payer: MEDICARE

## 2025-07-22 VITALS
SYSTOLIC BLOOD PRESSURE: 146 MMHG | TEMPERATURE: 97.6 F | HEIGHT: 61 IN | BODY MASS INDEX: 24.49 KG/M2 | HEART RATE: 61 BPM | WEIGHT: 129.7 LBS | DIASTOLIC BLOOD PRESSURE: 68 MMHG

## 2025-07-22 DIAGNOSIS — M81.0 OSTEOPOROSIS, UNSPECIFIED OSTEOPOROSIS TYPE, UNSPECIFIED PATHOLOGICAL FRACTURE PRESENCE: Primary | ICD-10-CM

## 2025-07-22 DIAGNOSIS — Z79.899 ENCOUNTER FOR LONG-TERM (CURRENT) USE OF HIGH-RISK MEDICATION: ICD-10-CM

## 2025-07-22 DIAGNOSIS — M06.9 RHEUMATOID ARTHRITIS, INVOLVING UNSPECIFIED SITE, UNSPECIFIED WHETHER RHEUMATOID FACTOR PRESENT: ICD-10-CM

## 2025-07-22 DIAGNOSIS — M79.7 FIBROMYALGIA: ICD-10-CM

## 2025-07-22 DIAGNOSIS — Z51.81 ENCOUNTER FOR MEDICATION MONITORING: ICD-10-CM

## 2025-07-22 RX ORDER — METHOTREXATE 2.5 MG/1
15 TABLET ORAL WEEKLY
Qty: 72 TABLET | Refills: 0 | Status: SHIPPED | OUTPATIENT
Start: 2025-07-22

## 2025-07-22 RX ORDER — METHOTREXATE 2.5 MG/1
15 TABLET ORAL WEEKLY
Qty: 24 TABLET | Refills: 3 | OUTPATIENT
Start: 2025-07-22

## 2025-07-22 RX ORDER — DONEPEZIL HYDROCHLORIDE 10 MG/1
10 TABLET, FILM COATED ORAL NIGHTLY
COMMUNITY
Start: 2025-06-20

## 2025-07-22 RX ORDER — RAMIPRIL 10 MG/1
10 CAPSULE ORAL DAILY
COMMUNITY
Start: 2025-06-10

## 2025-07-22 RX ORDER — GABAPENTIN 100 MG/1
100 CAPSULE ORAL 2 TIMES DAILY
Qty: 60 CAPSULE | Refills: 3 | Status: SHIPPED | OUTPATIENT
Start: 2025-07-22

## 2025-07-22 NOTE — TELEPHONE ENCOUNTER
Pt on Prolia, last given 10/24 in chart.    Appears she missed 4/25 Prolia injection.  Please schedule for continue Prolia due now if she missed April injection

## 2025-07-22 NOTE — TELEPHONE ENCOUNTER
Therapy plan entered and sent to provider to review and sign. We can try infusion center via Medical benefit due to high copays via pharmacy benefit. Referral in for  [facility].

## 2025-07-22 NOTE — PROGRESS NOTES
Office Follow Up      Date: 07/22/2025   Patient Name: Marcia Myles  MRN: [id number]  YOB: 1945    Referring Physician: No ref. provider found     Chief Complaint:   Chief Complaint   Patient presents with    Rheumatoid Arthritis       History of Present Illness: Marcia Myles is a 79 y.o. female who is here today for follow up on RA, fibromyalgia, OA, and osteoporosis.     She is accompanied today by her daughter. She has Alzheimer's dementia.  AM stiffness 1.5 hours  Continues on Actemra injection every other week, but her daughter reports the price has really gone up and does not think she can afford to stay on it  She continues on MTX once weekly. Tolerates well.  No RA flares  No recent illnesses or infections.  Some occasional muscle spasms and muscle aches.  Overall she feels she is doing well.    DEXA scan 8/13/2024 did show improvement to osteopenia in the left femoral neck with T-score -2.3.  Continues Prolia every 6 months typically October and April    She continues on Zoloft and gabapentin for fibromyalgia.    History of Present Illness        Subjective       Review of Systems: Review of Systems   Constitutional:  Positive for fatigue. Negative for chills, fever and unexpected weight loss.   HENT:  Positive for hearing loss and rhinorrhea. Negative for mouth sores, sinus pressure and sore throat.    Eyes:  Positive for photophobia. Negative for pain and redness.   Respiratory:  Positive for shortness of breath. Negative for cough.    Cardiovascular:  Positive for chest pain and leg swelling.   Gastrointestinal:  Negative for abdominal pain, blood in stool, diarrhea, nausea, vomiting and GERD.   Endocrine: Positive for cold intolerance. Negative for polydipsia and polyuria.   Genitourinary:  Positive for decreased libido. Negative for dysuria, genital sores and hematuria.   Musculoskeletal:  Positive for arthralgias, back pain, gait problem, joint swelling,  myalgias, neck pain and neck stiffness.   Skin:  Positive for dry skin. Negative for rash and bruise.   Allergic/Immunologic: Positive for environmental allergies.   Neurological:  Positive for dizziness, weakness, light-headedness, headache, memory problem and confusion. Negative for seizures and numbness.   Hematological:  Negative for adenopathy. Does not bruise/bleed easily.   Psychiatric/Behavioral:  Positive for decreased concentration, depressed mood and stress. The patient is nervous/anxious.         Past Medical History:   Past Medical History:   Diagnosis Date    Alzheimer disease     Followed by  neuroscience    Atrial fibrillation     RONNIE 02/05/2018 -Followed by Dr. Marie. Treated with Meds.    B12 deficiency     Bladder prolapse, female, acquired     DDD (degenerative disc disease), lumbar     severe- Dr. Parry/ pain clinic    Encounter for immunological test     Fatigue     Female bladder prolapse     Fibromyalgia     Irritability     Low back pain     Osteoporosis     Rash     Rheumatoid arthritis        Past Surgical History: History reviewed. No pertinent surgical history.    Family History:   Family History   Problem Relation Age of Onset    Diabetes Mother     Hypertension Mother     Osteoarthritis Mother     Diabetes Father     Diabetes Brother     Diabetes Son     Gout Son     Osteoarthritis Son     Hypertension Son        Social History:   Social History     Socioeconomic History    Marital status:     Number of children: 2   Tobacco Use    Smoking status: Never    Smokeless tobacco: Never   Vaping Use    Vaping status: Never Used   Substance and Sexual Activity    Alcohol use: Never    Drug use: Never    Sexual activity: Defer       Medications:   Current Outpatient Medications:     acetaminophen (TYLENOL) 650 MG 8 hr tablet, Take 1 tablet by mouth Every 8 (Eight) Hours As Needed (swallowing whole with water. Do not break, crush, dissolve and/or chew.)., Disp: , Rfl:     apixaban  (ELIQUIS) 5 MG tablet tablet, Take 1 tablet by mouth 2 (Two) Times a Day., Disp: , Rfl:     Cholecalciferol 25 MCG (1000 UT) tablet, Take 1 tablet by mouth Daily., Disp: , Rfl:     denosumab (Prolia) 60 MG/ML solution prefilled syringe syringe, Inject 1 mL under the skin into the appropriate area as directed Every 6 (Six) Months. in the upper arm, upper thigh or abdomen, Disp: 180 mL, Rfl: 0    donepezil (ARICEPT) 10 MG tablet, Take 1 tablet by mouth Every Night., Disp: , Rfl:     dronedarone (MULTAQ) 400 MG tablet, Take 1 tablet by mouth 2 (Two) Times a Day With Meals., Disp: , Rfl:     famotidine (PEPCID) 20 MG tablet, Take 1 tablet by mouth 2 (Two) Times a Day., Disp: 180 tablet, Rfl: 1    folic acid (FOLVITE) 1 MG tablet, Take 1 tablet by mouth Daily., Disp: 90 tablet, Rfl: 1    furosemide (LASIX) 20 MG tablet, Take 1 tablet by mouth 2 (Two) Times a Day., Disp: , Rfl:     gabapentin (NEURONTIN) 100 MG capsule, Take 1 capsule by mouth 2 (Two) Times a Day., Disp: 60 capsule, Rfl: 3    hydroCHLOROthiazide (HYDRODIURIL) 25 MG tablet, Take 1 tablet by mouth Daily., Disp: , Rfl:     isosorbide mononitrate (IMDUR) 30 MG 24 hr tablet, Take 1 tablet by mouth Daily., Disp: , Rfl:     magnesium 30 MG tablet, Take 1 tablet by mouth Daily., Disp: , Rfl:     methotrexate 2.5 MG tablet, Take 6 tablets by mouth 1 (One) Time Per Week., Disp: 72 tablet, Rfl: 0    nitroglycerin (NITROSTAT) 0.4 MG SL tablet, Place 1 tablet under the tongue Every 5 (Five) Minutes As Needed., Disp: , Rfl:     omeprazole (priLOSEC) 40 MG capsule, Take 1 capsule by mouth Daily., Disp: , Rfl:     potassium chloride (KLOR-CON) 8 MEQ CR tablet, Take 1 tablet by mouth 2 (Two) Times a Day., Disp: , Rfl:     pravastatin (PRAVACHOL) 20 MG tablet, Take 1 tablet by mouth Daily., Disp: , Rfl:     ramipril (ALTACE) 10 MG capsule, Take 1 capsule by mouth Daily., Disp: , Rfl:     sertraline (ZOLOFT) 50 MG tablet, Take 1 tablet by mouth Daily., Disp: 90 tablet, Rfl:  "1    Tocilizumab (ACTPEN) 162 MG/0.9ML solution auto-injector injection, Inject 0.9 mL under the skin into the appropriate area as directed Every 14 (Fourteen) Days., Disp: 5.4 mL, Rfl: 0    vitamin B-12 (CYANOCOBALAMIN) 500 MCG tablet, Take 1 tablet by mouth Daily., Disp: , Rfl:     Allergies:   Allergies   Allergen Reactions    Milnacipran Other (See Comments)     Headache    Penicillins Rash    Ranolazine Other (See Comments)     Headache             Objective        Vital Signs:   Vitals:    07/22/25 1007   BP: 146/68   BP Location: Left arm   Patient Position: Sitting   Cuff Size: Adult   Pulse: 61   Temp: 97.6 °F (36.4 °C)   Weight: 58.8 kg (129 lb 11.2 oz)   Height: 154.9 cm (61\")   PainSc: 7      Body mass index is 24.51 kg/m².      Physical Exam:  Physical Exam   MUSCULOSKELETAL:   No peripheral synovitis.  No dactylitis.  No pitting of the nails.  No tender joints.  Scattered tender points present    Complete joint exam was performed including the MCPs, PIPs, DIPs of the hands, wrists, elbows, shoulders, hips, knees and ankles.  No soft tissue swelling or tenderness is present except as above.    General: The patient is well-developed and well nourished.  Mental status at baseline.  Alert.  Disoriented.  Affect is normal. Hydration appears normal.   HEENT: Normocephalic and atraumatic. Lids and conjunctiva are normal. Pupils are equal and sclera are clear. Oropharynx is clear   NECK neck is supple without adenopathy, masses or thyromegaly.   CARDIOVASCULAR: Regular rate and rhythm. No murmurs, rubs or gallops   LUNGS: Effort is normal. Lungs are clear bilateral   ABDOMEN: Not examined  EXTREMITIES: Peripheral pulses are intact. No clubbing.   SKIN: No rashes. No subcutaneous nodules. No digital ulcers. No sclerodactyly.   NEUROLOGIC: Gait is normal. Strength testing is normal.  No focal neurologic deficits    Results Review:   Labs:   Lab Results   Component Value Date    GLUCOSE 100 (H) 02/07/2025    BUN " "18 02/07/2025    CREATININE 1.05 (H) 02/07/2025    EGFR 53.8 (L) 02/07/2025    BCR 17.1 02/07/2025    K 3.8 02/07/2025    CO2 26.0 02/07/2025    CALCIUM 9.3 02/07/2025    ALBUMIN 4.1 02/07/2025    BILITOT 0.3 02/07/2025    AST 27 02/07/2025    ALT 18 02/07/2025     Lab Results   Component Value Date    WBC 8.79 07/08/2019    HGB 12.1 07/08/2019    HCT 38.4 07/08/2019    MCV 97.0 07/08/2019     07/08/2019     No results found for: \"SEDRATE\"  No results found for: \"CRP\"  Lab Results   Component Value Date    QUANTIFERO Incubation performed. 02/07/2025    QUANTIFERO Comment 02/07/2025    QUANTITB1 0.07 02/07/2025    QUANTITB2 0.06 02/07/2025    QUANTIFERN 0.07 02/07/2025    QUANTIFERM >10.00 02/07/2025    QUANTITBGLDP Negative 02/07/2025     No results found for: \"RF\"  Lab Results   Component Value Date    HEPBSAG Non-Reactive 02/07/2025    HEPAIGM Non-Reactive 02/07/2025    HEPBIGMCORE Non-Reactive 02/07/2025    HEPCVIRUSABY Non-Reactive 02/07/2025         Procedures    Assessment / Plan        -Rheumatoid arthritis  Assessment & Plan:  Patient with Alzheimer's.  Former patient Dr. Lo  erosive disease by hand and foot films historically  per pt, also dx \"AS, psoriasis\" distant past.  Failed meds: Humira (worked well, lost insurance coverage), Cimzia (rash), plaquenil (eye issues)  S/p Xeljanz since 4/21/17- 2/5/18- Not as effective  Started Simponi Aria March 2018 - Last 1/21/19  Started Orencia 7/2019- Last 1/15/2020 - per patient this was not effective.  Started Inflectra March 2nd 2020- Last infusion 12/7/2020- not effective  Avoids NSAIDs with CKD    *Current Rx: Actemra SQ q 2 weeks (too costly, show she we will tried to go off it and see how she does without biologic)           Methotrexate d/cAugust 2022 (she was doing well so it was weaned)- restarted 9/2024       Low disease activity.  Swollen joint count 0.  Tender count 0.  Good prognosis  -Her daughter who accompanies her today reports Actemra has " become too expensive at $200 a month.  I think it be reasonable to try stopping Actemra once they run out in the coming weeks and see how she does on methotrexate alone moving forward.  -Continue MTX 15 mg weekly with daily folic acid.  -Labs reviewed and stable.  Continue labs every 12 weeks CBC CMP sed rate CRP   new order provided to do today and every 12 weeks.  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   MTX restart 9/2024    Well-tolerated and effective  Hepatitis Negative 2/7/2025  QTB negative 2/7/2025     Continue monitoring labs Cbc,Cmp Q 3 mos     Would hold Actemra and MTX for any infection or illness, Seek treatment and when well and illness is resolved you may restart medication.     Risk include but are not limited to severe liver damage so can be fatal, the possible need for liver biopsy, bone marrow suppression that can lead to dangerously low blood counts, GI side effects including mouth sores and diarrhea, fatigue, and the rare risk of severe pulmonary complications.  There should be no alcohol consumed with methotrexate.  Methotrexate can cause severe fetal abnormalities whether the mother or father is taking the medication, and thus must be avoided if pregnancy is a possibility.  Strict birth control recommended.  All medication is to be taken 1 day a week only.  The need for Q 8-12-week labs and the need for folic acid supplement were discussed.    We discussed biologic agents at length. Risks and alternative were discussed at length and the option of no treatment was also given. We discussed risks including but not limited to infections which can be unusual,  severe, and deadly. When possible, these agents should be stopped immediately if infections occur. Unusual infection such as  TB and fungal infections can occur. There may be an increased risk of lymphoma with these agents. Other risks can include are multiple sclerosis like illness and worsening of the failure. Infusion or injection reactions whish can be delay have been reported.  Reactivation of daily brain viruses have been reported.  Worsening of COPD has been seen with Orencia.  Elevated lipids, elevations in liver functions, and dangerous changes in blood counts have been seen with certain agents.  Regular monitoring will be required.       - Fibromyalgia  Assessment & Plan:  Chronic diffuse muscle pain.  Sx wax and wane.  Sleep issues are intermittent.     Currently on Zoloft  50mg per day.   Continue Gabapentin 100 mg BID.      Four Cornerstones of treatment include  1- Treat Sleep- restoration.  2- Exercise- chair aerobics or chair yoga; Water aerobics.  3- Treat anxiety and depression  4- Treat pain. Non addictive muscle relaxer. Biofreeze, Massage , Heat , ICE.  FMS packet given and discussed for her to work with pcp regarding treatment.      - Encounter for medication monitoring  Assessment & Plan:  Gabapentin.     Signed and discussed controlled substance agreement 9/17/24  Intermittent UDS for monitoring  PDMP reviewed        - Osteoporosis  Assessment & Plan:  Severe; Boniva x 2 yrs, then fosamax x 4 yrs.  Started prolia August 2016.  MRI Sept 2019 showed chronic posterior wedging of L5.  6/09/2021 DEXA-  left Hip TScore -2.3 total hip improved by 5.9%, Femoral neck -2.5 (osteoporosis) No change. Spine L1-L3 -1.7 (osteopenia) Improved by 15.6%. Left Forearm Total- -4.0 (severe osteoporosis) 1/3 value -3.6 (osteoporosis) no change.   -DEXA 8/13/24 improved to osteopenia left femoral neck -2.3, L spine -1.0     Continue Prolia.   Most recent dose was given 10/11/24.  It appears she missed her April 2025 dose.  Reschedule Prolia now.  Monitor calcium and vitamin D  Weight bearing exercise as much as possible.   Continue 2000IU  of D3 gel caps per day.  If she has any dental work done (root canal, dental implant, extraction) she needs to hold Prolia 3 months before and 3 months after procedure.  Risk benefits of prolia discussed in detail including but not limited to osteonecrosis jaw, atypical femoral fracture, bone death, kidney failure, hypocalcemia. Patient has no reported jaw or tooth pain.  Repeat DEXA 8/2026       - Chronic kidney disease  Assessment & Plan:  4/22 Cr 1.20/47.  8/2024 Cr 1.00/57     Followed by nephrology associates.  Avoid NSAIDs  Plenty of fluids.   Can use Biofreeze or CBD lotion.         1. Rheumatoid arthritis, involving unspecified site, unspecified whether rheumatoid factor present    2. Encounter for long-term (current) use of high-risk medication    3. Encounter for medication monitoring    4. Fibromyalgia        Assessment & Plan        Orders Placed This Encounter   Procedures    CBC Auto Differential    Comprehensive Metabolic Panel    C-reactive Protein    Sedimentation Rate    Comprehensive Metabolic Panel    C-reactive Protein    Sedimentation Rate    CBC & Differential     New Medications Ordered This Visit   Medications    methotrexate 2.5 MG tablet     Sig: Take 6 tablets by mouth 1 (One) Time Per Week.     Dispense:  72 tablet     Refill:  0    gabapentin (NEURONTIN) 100 MG capsule     Sig: Take 1 capsule by mouth 2 (Two) Times a Day.     Dispense:  60 capsule     Refill:  3    sertraline (ZOLOFT) 50 MG tablet     Sig: Take 1 tablet by mouth Daily.     Dispense:  90 tablet     Refill:  1       Follow Up:   Return in about 3 months (around 10/22/2025) for Followup APRN.      Discussed plan of care in detail with the patient today.  Patient verbalized understanding and agrees.    I confirm accuracy of unchanged data/findings which have been carried forward from previous visit.  I have updated appropriately those that have changed.        Valentin Myles MD  [facility] [place] [facility] [place]

## 2025-07-22 NOTE — TELEPHONE ENCOUNTER
It looks like when patient was due in April, this was too expensive through our [facility]. We sent referral to [facility]. I confirmed with [facility] patient had never had treatment there. Will recheck with specialty pharmacy.

## 2025-07-23 ENCOUNTER — RESULTS FOLLOW-UP (OUTPATIENT)
Age: 80
End: 2025-07-23
Payer: MEDICARE

## 2025-07-23 LAB
ALBUMIN SERPL-MCNC: 4.3 G/DL (ref 3.5–5.2)
ALBUMIN/GLOB SERPL: 1.4 G/DL
ALP SERPL-CCNC: 118 U/L (ref 39–117)
ALT SERPL-CCNC: 33 U/L (ref 1–33)
AST SERPL-CCNC: 35 U/L (ref 1–32)
BASOPHILS # BLD AUTO: 0.06 10*3/MM3 (ref 0–0.2)
BASOPHILS NFR BLD AUTO: 0.9 % (ref 0–1.5)
BILIRUB SERPL-MCNC: 0.3 MG/DL (ref 0–1.2)
BUN SERPL-MCNC: 27 MG/DL (ref 8–23)
BUN/CREAT SERPL: 27.3 (ref 7–25)
CALCIUM SERPL-MCNC: 10.2 MG/DL (ref 8.6–10.5)
CHLORIDE SERPL-SCNC: 103 MMOL/L (ref 98–107)
CO2 SERPL-SCNC: 26.5 MMOL/L (ref 22–29)
CREAT SERPL-MCNC: 0.99 MG/DL (ref 0.57–1)
CRP SERPL-MCNC: <0.3 MG/DL (ref 0–0.5)
EGFRCR SERPLBLD CKD-EPI 2021: 57.8 ML/MIN/1.73
EOSINOPHIL # BLD AUTO: 0.28 10*3/MM3 (ref 0–0.4)
EOSINOPHIL NFR BLD AUTO: 4.1 % (ref 0.3–6.2)
ERYTHROCYTE [DISTWIDTH] IN BLOOD BY AUTOMATED COUNT: 11.8 % (ref 12.3–15.4)
ERYTHROCYTE [SEDIMENTATION RATE] IN BLOOD BY WESTERGREN METHOD: 13 MM/HR (ref 0–30)
GLOBULIN SER CALC-MCNC: 3 GM/DL
GLUCOSE SERPL-MCNC: 119 MG/DL (ref 65–99)
HCT VFR BLD AUTO: 45.3 % (ref 34–46.6)
HGB BLD-MCNC: 14.6 G/DL (ref 12–15.9)
IMM GRANULOCYTES # BLD AUTO: 0.02 10*3/MM3 (ref 0–0.05)
IMM GRANULOCYTES NFR BLD AUTO: 0.3 % (ref 0–0.5)
LYMPHOCYTES # BLD AUTO: 1.3 10*3/MM3 (ref 0.7–3.1)
LYMPHOCYTES NFR BLD AUTO: 18.9 % (ref 19.6–45.3)
MCH RBC QN AUTO: 31.2 PG (ref 26.6–33)
MCHC RBC AUTO-ENTMCNC: 32.2 G/DL (ref 31.5–35.7)
MCV RBC AUTO: 96.8 FL (ref 79–97)
MONOCYTES # BLD AUTO: 0.37 10*3/MM3 (ref 0.1–0.9)
MONOCYTES NFR BLD AUTO: 5.4 % (ref 5–12)
NEUTROPHILS # BLD AUTO: 4.85 10*3/MM3 (ref 1.7–7)
NEUTROPHILS NFR BLD AUTO: 70.4 % (ref 42.7–76)
NRBC BLD AUTO-RTO: 0 /100 WBC (ref 0–0.2)
PLATELET # BLD AUTO: 225 10*3/MM3 (ref 140–450)
POTASSIUM SERPL-SCNC: 4.4 MMOL/L (ref 3.5–5.2)
PROT SERPL-MCNC: 7.3 G/DL (ref 6–8.5)
RBC # BLD AUTO: 4.68 10*6/MM3 (ref 3.77–5.28)
SODIUM SERPL-SCNC: 143 MMOL/L (ref 136–145)
WBC # BLD AUTO: 6.88 10*3/MM3 (ref 3.4–10.8)

## 2025-08-05 ENCOUNTER — TELEPHONE (OUTPATIENT)
Age: 80
End: 2025-08-05
Payer: MEDICARE

## 2025-08-06 DIAGNOSIS — R79.89 LOW VITAMIN D LEVEL: Primary | ICD-10-CM

## 2025-08-06 DIAGNOSIS — R53.83 FATIGUE, UNSPECIFIED TYPE: ICD-10-CM

## 2025-08-06 DIAGNOSIS — M81.0 OSTEOPOROSIS, UNSPECIFIED OSTEOPOROSIS TYPE, UNSPECIFIED PATHOLOGICAL FRACTURE PRESENCE: ICD-10-CM

## 2025-08-07 DIAGNOSIS — M81.0 OSTEOPOROSIS, UNSPECIFIED OSTEOPOROSIS TYPE, UNSPECIFIED PATHOLOGICAL FRACTURE PRESENCE: Primary | ICD-10-CM

## 2025-08-08 ENCOUNTER — INFUSION (OUTPATIENT)
Facility: HOSPITAL | Age: 80
End: 2025-08-08
Payer: MEDICARE

## 2025-08-08 VITALS
DIASTOLIC BLOOD PRESSURE: 64 MMHG | WEIGHT: 131.8 LBS | BODY MASS INDEX: 24.9 KG/M2 | RESPIRATION RATE: 18 BRPM | HEART RATE: 56 BPM | TEMPERATURE: 97.5 F | SYSTOLIC BLOOD PRESSURE: 134 MMHG

## 2025-08-08 DIAGNOSIS — M81.0 OSTEOPOROSIS, UNSPECIFIED OSTEOPOROSIS TYPE, UNSPECIFIED PATHOLOGICAL FRACTURE PRESENCE: Primary | ICD-10-CM

## 2025-08-08 PROCEDURE — 96372 THER/PROPH/DIAG INJ SC/IM: CPT

## 2025-08-08 PROCEDURE — 25010000002 DENOSUMAB 60 MG/ML SOLUTION PREFILLED SYRINGE: Performed by: INTERNAL MEDICINE

## 2025-08-08 RX ADMIN — DENOSUMAB 60 MG: 60 INJECTION SUBCUTANEOUS at 14:21
